# Patient Record
Sex: MALE | Race: OTHER | HISPANIC OR LATINO | URBAN - METROPOLITAN AREA
[De-identification: names, ages, dates, MRNs, and addresses within clinical notes are randomized per-mention and may not be internally consistent; named-entity substitution may affect disease eponyms.]

---

## 2020-01-01 ENCOUNTER — INPATIENT (INPATIENT)
Facility: HOSPITAL | Age: 65
LOS: 5 days | DRG: 870 | End: 2020-04-14
Attending: SURGERY | Admitting: SURGERY
Payer: MEDICARE

## 2020-01-01 VITALS
HEART RATE: 95 BPM | DIASTOLIC BLOOD PRESSURE: 39 MMHG | SYSTOLIC BLOOD PRESSURE: 66 MMHG | OXYGEN SATURATION: 70 % | TEMPERATURE: 101 F | RESPIRATION RATE: 25 BRPM

## 2020-01-01 VITALS
DIASTOLIC BLOOD PRESSURE: 91 MMHG | OXYGEN SATURATION: 83 % | HEART RATE: 124 BPM | SYSTOLIC BLOOD PRESSURE: 143 MMHG | RESPIRATION RATE: 36 BRPM

## 2020-01-01 DIAGNOSIS — Z51.5 ENCOUNTER FOR PALLIATIVE CARE: ICD-10-CM

## 2020-01-01 DIAGNOSIS — R53.81 OTHER MALAISE: ICD-10-CM

## 2020-01-01 DIAGNOSIS — J96.00 ACUTE RESPIRATORY FAILURE, UNSPECIFIED WHETHER WITH HYPOXIA OR HYPERCAPNIA: ICD-10-CM

## 2020-01-01 DIAGNOSIS — J18.9 PNEUMONIA, UNSPECIFIED ORGANISM: ICD-10-CM

## 2020-01-01 DIAGNOSIS — E43 UNSPECIFIED SEVERE PROTEIN-CALORIE MALNUTRITION: ICD-10-CM

## 2020-01-01 DIAGNOSIS — N17.9 ACUTE KIDNEY FAILURE, UNSPECIFIED: ICD-10-CM

## 2020-01-01 LAB
ALBUMIN SERPL ELPH-MCNC: 1.7 G/DL — LOW (ref 3.5–5)
ALBUMIN SERPL ELPH-MCNC: 2 G/DL — LOW (ref 3.5–5)
ALBUMIN SERPL ELPH-MCNC: 2.2 G/DL — LOW (ref 3.5–5)
ALBUMIN SERPL ELPH-MCNC: 2.3 G/DL — LOW (ref 3.5–5)
ALBUMIN SERPL ELPH-MCNC: 2.5 G/DL — LOW (ref 3.5–5)
ALBUMIN SERPL ELPH-MCNC: 2.8 G/DL — LOW (ref 3.5–5)
ALP SERPL-CCNC: 112 U/L — SIGNIFICANT CHANGE UP (ref 40–120)
ALP SERPL-CCNC: 173 U/L — HIGH (ref 40–120)
ALP SERPL-CCNC: 51 U/L — SIGNIFICANT CHANGE UP (ref 40–120)
ALP SERPL-CCNC: 53 U/L — SIGNIFICANT CHANGE UP (ref 40–120)
ALP SERPL-CCNC: 62 U/L — SIGNIFICANT CHANGE UP (ref 40–120)
ALP SERPL-CCNC: 88 U/L — SIGNIFICANT CHANGE UP (ref 40–120)
ALT FLD-CCNC: 116 U/L DA — HIGH (ref 10–60)
ALT FLD-CCNC: 150 U/L DA — HIGH (ref 10–60)
ALT FLD-CCNC: 275 U/L DA — HIGH (ref 10–60)
ALT FLD-CCNC: 329 U/L DA — HIGH (ref 10–60)
ALT FLD-CCNC: 33 U/L DA — SIGNIFICANT CHANGE UP (ref 10–60)
ALT FLD-CCNC: 45 U/L DA — SIGNIFICANT CHANGE UP (ref 10–60)
ANION GAP SERPL CALC-SCNC: 11 MMOL/L — SIGNIFICANT CHANGE UP (ref 5–17)
ANION GAP SERPL CALC-SCNC: 14 MMOL/L — SIGNIFICANT CHANGE UP (ref 5–17)
ANION GAP SERPL CALC-SCNC: 19 MMOL/L — HIGH (ref 5–17)
ANION GAP SERPL CALC-SCNC: 2 MMOL/L — LOW (ref 5–17)
ANION GAP SERPL CALC-SCNC: 4 MMOL/L — LOW (ref 5–17)
ANION GAP SERPL CALC-SCNC: 5 MMOL/L — SIGNIFICANT CHANGE UP (ref 5–17)
ANION GAP SERPL CALC-SCNC: 6 MMOL/L — SIGNIFICANT CHANGE UP (ref 5–17)
ANION GAP SERPL CALC-SCNC: 8 MMOL/L — SIGNIFICANT CHANGE UP (ref 5–17)
ANISOCYTOSIS BLD QL: SLIGHT — SIGNIFICANT CHANGE UP
APTT BLD: 35.2 SEC — SIGNIFICANT CHANGE UP (ref 27.5–36.3)
APTT BLD: 39.2 SEC — HIGH (ref 27.5–36.3)
AST SERPL-CCNC: 125 U/L — HIGH (ref 10–40)
AST SERPL-CCNC: 151 U/L — HIGH (ref 10–40)
AST SERPL-CCNC: 170 U/L — HIGH (ref 10–40)
AST SERPL-CCNC: 170 U/L — HIGH (ref 10–40)
AST SERPL-CCNC: 636 U/L — HIGH (ref 10–40)
AST SERPL-CCNC: 796 U/L — HIGH (ref 10–40)
BASE EXCESS BLDA CALC-SCNC: -2.6 MMOL/L — LOW (ref -2–2)
BASE EXCESS BLDA CALC-SCNC: -2.9 MMOL/L — SIGNIFICANT CHANGE UP (ref -2–2)
BASE EXCESS BLDA CALC-SCNC: -9.8 MMOL/L — LOW (ref -2–2)
BASE EXCESS BLDA CALC-SCNC: 1.9 MMOL/L — SIGNIFICANT CHANGE UP (ref -2–2)
BASE EXCESS BLDA CALC-SCNC: 28.5 MMOL/L — HIGH (ref -2–2)
BASE EXCESS BLDA CALC-SCNC: 4.8 MMOL/L — SIGNIFICANT CHANGE UP (ref -2–2)
BASE EXCESS BLDA CALC-SCNC: SIGNIFICANT CHANGE UP MMOL/L (ref -2–2)
BASOPHILS # BLD AUTO: 0 K/UL — SIGNIFICANT CHANGE UP (ref 0–0.2)
BASOPHILS # BLD AUTO: 0.01 K/UL — SIGNIFICANT CHANGE UP (ref 0–0.2)
BASOPHILS # BLD AUTO: 0.05 K/UL — SIGNIFICANT CHANGE UP (ref 0–0.2)
BASOPHILS # BLD AUTO: SIGNIFICANT CHANGE UP K/UL (ref 0–0.2)
BASOPHILS NFR BLD AUTO: 0 % — SIGNIFICANT CHANGE UP (ref 0–2)
BASOPHILS NFR BLD AUTO: 0.2 % — SIGNIFICANT CHANGE UP (ref 0–2)
BASOPHILS NFR BLD AUTO: 0.6 % — SIGNIFICANT CHANGE UP (ref 0–2)
BASOPHILS NFR BLD AUTO: SIGNIFICANT CHANGE UP % (ref 0–2)
BILIRUB SERPL-MCNC: 1.4 MG/DL — HIGH (ref 0.2–1.2)
BILIRUB SERPL-MCNC: 1.5 MG/DL — HIGH (ref 0.2–1.2)
BILIRUB SERPL-MCNC: 1.8 MG/DL — HIGH (ref 0.2–1.2)
BILIRUB SERPL-MCNC: 2 MG/DL — HIGH (ref 0.2–1.2)
BILIRUB SERPL-MCNC: 2.2 MG/DL — HIGH (ref 0.2–1.2)
BILIRUB SERPL-MCNC: 2.5 MG/DL — HIGH (ref 0.2–1.2)
BLOOD GAS COMMENTS ARTERIAL: SIGNIFICANT CHANGE UP
BUN SERPL-MCNC: 102 MG/DL — HIGH (ref 7–18)
BUN SERPL-MCNC: 109 MG/DL — HIGH (ref 7–18)
BUN SERPL-MCNC: 111 MG/DL — HIGH (ref 7–18)
BUN SERPL-MCNC: 113 MG/DL — HIGH (ref 7–18)
BUN SERPL-MCNC: 115 MG/DL — HIGH (ref 7–18)
BUN SERPL-MCNC: 117 MG/DL — HIGH (ref 7–18)
BUN SERPL-MCNC: 43 MG/DL — HIGH (ref 7–18)
BUN SERPL-MCNC: 50 MG/DL — HIGH (ref 7–18)
BUN SERPL-MCNC: 63 MG/DL — HIGH (ref 7–18)
BUN SERPL-MCNC: 76 MG/DL — HIGH (ref 7–18)
CALCIUM SERPL-MCNC: 8.1 MG/DL — LOW (ref 8.4–10.5)
CALCIUM SERPL-MCNC: 8.3 MG/DL — LOW (ref 8.4–10.5)
CALCIUM SERPL-MCNC: 8.5 MG/DL — SIGNIFICANT CHANGE UP (ref 8.4–10.5)
CALCIUM SERPL-MCNC: 8.6 MG/DL — SIGNIFICANT CHANGE UP (ref 8.4–10.5)
CALCIUM SERPL-MCNC: 8.8 MG/DL — SIGNIFICANT CHANGE UP (ref 8.4–10.5)
CALCIUM SERPL-MCNC: 8.9 MG/DL — SIGNIFICANT CHANGE UP (ref 8.4–10.5)
CALCIUM SERPL-MCNC: 9 MG/DL — SIGNIFICANT CHANGE UP (ref 8.4–10.5)
CALCIUM SERPL-MCNC: 9.2 MG/DL — SIGNIFICANT CHANGE UP (ref 8.4–10.5)
CD3-/CD16+CD56+(%): 5 % — SIGNIFICANT CHANGE UP (ref 4–25)
CD3-CD16+CD56+(ABS): 27 CELLS/UL — LOW (ref 70–760)
CHLORIDE SERPL-SCNC: 102 MMOL/L — SIGNIFICANT CHANGE UP (ref 96–108)
CHLORIDE SERPL-SCNC: 103 MMOL/L — SIGNIFICANT CHANGE UP (ref 96–108)
CHLORIDE SERPL-SCNC: 104 MMOL/L — SIGNIFICANT CHANGE UP (ref 96–108)
CHLORIDE SERPL-SCNC: 105 MMOL/L — SIGNIFICANT CHANGE UP (ref 96–108)
CHLORIDE SERPL-SCNC: 105 MMOL/L — SIGNIFICANT CHANGE UP (ref 96–108)
CHLORIDE SERPL-SCNC: 109 MMOL/L — HIGH (ref 96–108)
CHLORIDE SERPL-SCNC: 112 MMOL/L — HIGH (ref 96–108)
CHLORIDE SERPL-SCNC: 115 MMOL/L — HIGH (ref 96–108)
CHLORIDE SERPL-SCNC: 117 MMOL/L — HIGH (ref 96–108)
CHLORIDE SERPL-SCNC: 117 MMOL/L — HIGH (ref 96–108)
CK SERPL-CCNC: 168 U/L — SIGNIFICANT CHANGE UP (ref 35–232)
CO2 SERPL-SCNC: 15 MMOL/L — LOW (ref 22–31)
CO2 SERPL-SCNC: 20 MMOL/L — LOW (ref 22–31)
CO2 SERPL-SCNC: 23 MMOL/L — SIGNIFICANT CHANGE UP (ref 22–31)
CO2 SERPL-SCNC: 23 MMOL/L — SIGNIFICANT CHANGE UP (ref 22–31)
CO2 SERPL-SCNC: 25 MMOL/L — SIGNIFICANT CHANGE UP (ref 22–31)
CO2 SERPL-SCNC: 27 MMOL/L — SIGNIFICANT CHANGE UP (ref 22–31)
CO2 SERPL-SCNC: 28 MMOL/L — SIGNIFICANT CHANGE UP (ref 22–31)
CO2 SERPL-SCNC: 29 MMOL/L — SIGNIFICANT CHANGE UP (ref 22–31)
CO2 SERPL-SCNC: 30 MMOL/L — SIGNIFICANT CHANGE UP (ref 22–31)
CO2 SERPL-SCNC: 32 MMOL/L — HIGH (ref 22–31)
CREAT SERPL-MCNC: 1.72 MG/DL — HIGH (ref 0.5–1.3)
CREAT SERPL-MCNC: 1.76 MG/DL — HIGH (ref 0.5–1.3)
CREAT SERPL-MCNC: 1.96 MG/DL — HIGH (ref 0.5–1.3)
CREAT SERPL-MCNC: 2 MG/DL — HIGH (ref 0.5–1.3)
CREAT SERPL-MCNC: 2.45 MG/DL — HIGH (ref 0.5–1.3)
CREAT SERPL-MCNC: 2.59 MG/DL — HIGH (ref 0.5–1.3)
CREAT SERPL-MCNC: 2.84 MG/DL — HIGH (ref 0.5–1.3)
CREAT SERPL-MCNC: 3.01 MG/DL — HIGH (ref 0.5–1.3)
CREAT SERPL-MCNC: 3.07 MG/DL — HIGH (ref 0.5–1.3)
CREAT SERPL-MCNC: 3.08 MG/DL — HIGH (ref 0.5–1.3)
CRP SERPL-MCNC: 20.07 MG/DL — HIGH (ref 0–0.4)
CRP SERPL-MCNC: 30.05 MG/DL — HIGH (ref 0–0.4)
CRP SERPL-MCNC: 34.9 MG/DL — HIGH (ref 0–0.4)
CRP SERPL-MCNC: 8.89 MG/DL — HIGH (ref 0–0.4)
D DIMER BLD IA.RAPID-MCNC: 1280 NG/ML DDU — HIGH
D DIMER BLD IA.RAPID-MCNC: 2722 NG/ML DDU — HIGH
EOSINOPHIL # BLD AUTO: 0 K/UL — SIGNIFICANT CHANGE UP (ref 0–0.5)
EOSINOPHIL # BLD AUTO: 0.08 K/UL — SIGNIFICANT CHANGE UP (ref 0–0.5)
EOSINOPHIL # BLD AUTO: SIGNIFICANT CHANGE UP K/UL (ref 0–0.5)
EOSINOPHIL NFR BLD AUTO: 0 % — SIGNIFICANT CHANGE UP (ref 0–6)
EOSINOPHIL NFR BLD AUTO: 0.9 % — SIGNIFICANT CHANGE UP (ref 0–6)
EOSINOPHIL NFR BLD AUTO: SIGNIFICANT CHANGE UP % (ref 0–6)
ERYTHROCYTE [SEDIMENTATION RATE] IN BLOOD: 42 MM/HR — HIGH (ref 0–20)
FERRITIN SERPL-MCNC: 3030 NG/ML — HIGH (ref 30–400)
FERRITIN SERPL-MCNC: 6072 NG/ML — HIGH (ref 30–400)
FERRITIN SERPL-MCNC: 6132 NG/ML — HIGH (ref 30–400)
FERRITIN SERPL-MCNC: 9172 NG/ML — HIGH (ref 30–400)
FIBRINOGEN PPP-MCNC: 590 MG/DL — HIGH (ref 350–510)
G6PD RBC-CCNC: 18.7 U/G HGB — SIGNIFICANT CHANGE UP (ref 7–20.5)
GLUCOSE SERPL-MCNC: 123 MG/DL — HIGH (ref 70–99)
GLUCOSE SERPL-MCNC: 124 MG/DL — HIGH (ref 70–99)
GLUCOSE SERPL-MCNC: 124 MG/DL — HIGH (ref 70–99)
GLUCOSE SERPL-MCNC: 131 MG/DL — HIGH (ref 70–99)
GLUCOSE SERPL-MCNC: 180 MG/DL — HIGH (ref 70–99)
GLUCOSE SERPL-MCNC: 211 MG/DL — HIGH (ref 70–99)
GLUCOSE SERPL-MCNC: 278 MG/DL — HIGH (ref 70–99)
GLUCOSE SERPL-MCNC: 309 MG/DL — HIGH (ref 70–99)
GLUCOSE SERPL-MCNC: 373 MG/DL — HIGH (ref 70–99)
GLUCOSE SERPL-MCNC: 92 MG/DL — SIGNIFICANT CHANGE UP (ref 70–99)
HBA1C BLD-MCNC: 4.9 % — SIGNIFICANT CHANGE UP (ref 4–5.6)
HCO3 BLDA-SCNC: 15 MMOL/L — LOW (ref 23–27)
HCO3 BLDA-SCNC: 22 MMOL/L — LOW (ref 23–27)
HCO3 BLDA-SCNC: 26 MMOL/L — SIGNIFICANT CHANGE UP (ref 23–27)
HCO3 BLDA-SCNC: 26 MMOL/L — SIGNIFICANT CHANGE UP (ref 23–27)
HCO3 BLDA-SCNC: 27 MMOL/L — SIGNIFICANT CHANGE UP (ref 23–27)
HCO3 BLDA-SCNC: 28 MMOL/L — HIGH (ref 23–27)
HCO3 BLDA-SCNC: 62 MMOL/L — HIGH (ref 23–27)
HCT VFR BLD CALC: 29.4 % — LOW (ref 39–50)
HCT VFR BLD CALC: 31.7 % — LOW (ref 39–50)
HCT VFR BLD CALC: 33.7 % — LOW (ref 39–50)
HCT VFR BLD CALC: 34.5 % — LOW (ref 39–50)
HCT VFR BLD CALC: 37.6 % — LOW (ref 39–50)
HCT VFR BLD CALC: 38.9 % — LOW (ref 39–50)
HCT VFR BLD CALC: 39.5 % — SIGNIFICANT CHANGE UP (ref 39–50)
HGB BLD-MCNC: 10.4 G/DL — LOW (ref 13–17)
HGB BLD-MCNC: 10.6 G/DL — LOW (ref 13–17)
HGB BLD-MCNC: 11.1 G/DL — LOW (ref 13–17)
HGB BLD-MCNC: 11.8 G/DL — LOW (ref 13–17)
HGB BLD-MCNC: 11.9 G/DL — LOW (ref 13–17)
HGB BLD-MCNC: 12.5 G/DL — LOW (ref 13–17)
HGB BLD-MCNC: 9.3 G/DL — LOW (ref 13–17)
HOROWITZ INDEX BLDA+IHG-RTO: 100 — SIGNIFICANT CHANGE UP
HOROWITZ INDEX BLDA+IHG-RTO: 100 — SIGNIFICANT CHANGE UP
HOROWITZ INDEX BLDA+IHG-RTO: 50 — SIGNIFICANT CHANGE UP
HOROWITZ INDEX BLDA+IHG-RTO: 60 — SIGNIFICANT CHANGE UP
HOROWITZ INDEX BLDA+IHG-RTO: 70 — SIGNIFICANT CHANGE UP
HYPOCHROMIA BLD QL: SLIGHT — SIGNIFICANT CHANGE UP
HYPOSEGMENTATION: PRESENT — SIGNIFICANT CHANGE UP
IMM GRANULOCYTES NFR BLD AUTO: 0.7 % — SIGNIFICANT CHANGE UP (ref 0–1.5)
IMM GRANULOCYTES NFR BLD AUTO: 1.2 % — SIGNIFICANT CHANGE UP (ref 0–1.5)
IMM GRANULOCYTES NFR BLD AUTO: SIGNIFICANT CHANGE UP % (ref 0–1.5)
INR BLD: 1.4 RATIO — HIGH (ref 0.88–1.16)
INR BLD: 1.41 RATIO — HIGH (ref 0.88–1.16)
LACTATE SERPL-SCNC: 12.2 MMOL/L — CRITICAL HIGH (ref 0.7–2)
LACTATE SERPL-SCNC: 2.9 MMOL/L — HIGH (ref 0.7–2)
LACTATE SERPL-SCNC: 4 MMOL/L — CRITICAL HIGH (ref 0.7–2)
LACTATE SERPL-SCNC: 4.6 MMOL/L — CRITICAL HIGH (ref 0.7–2)
LDH SERPL L TO P-CCNC: 910 U/L — HIGH (ref 120–225)
LDH SERPL L TO P-CCNC: 975 U/L — HIGH (ref 120–225)
LYMPHOCYTES # BLD AUTO: 0.46 K/UL — LOW (ref 1–3.3)
LYMPHOCYTES # BLD AUTO: 0.56 K/UL — LOW (ref 1–3.3)
LYMPHOCYTES # BLD AUTO: 0.85 K/UL — LOW (ref 1–3.3)
LYMPHOCYTES # BLD AUTO: 1.09 K/UL — SIGNIFICANT CHANGE UP (ref 1–3.3)
LYMPHOCYTES # BLD AUTO: 1.27 K/UL — SIGNIFICANT CHANGE UP (ref 1–3.3)
LYMPHOCYTES # BLD AUTO: 11 % — LOW (ref 13–44)
LYMPHOCYTES # BLD AUTO: 11.3 % — LOW (ref 13–44)
LYMPHOCYTES # BLD AUTO: 12 % — LOW (ref 13–44)
LYMPHOCYTES # BLD AUTO: 14.8 % — SIGNIFICANT CHANGE UP (ref 13–44)
LYMPHOCYTES # BLD AUTO: 16 % — SIGNIFICANT CHANGE UP (ref 13–44)
LYMPHOCYTES # BLD AUTO: SIGNIFICANT CHANGE UP % (ref 13–44)
LYMPHOCYTES # BLD AUTO: SIGNIFICANT CHANGE UP K/UL (ref 1–3.3)
LYMPHOCYTES, ABSOLUTE: 514 CELLS/UL — LOW (ref 850–3900)
MACROCYTES BLD QL: SLIGHT — SIGNIFICANT CHANGE UP
MAGNESIUM SERPL-MCNC: 2.1 MG/DL — SIGNIFICANT CHANGE UP (ref 1.6–2.6)
MAGNESIUM SERPL-MCNC: 2.1 MG/DL — SIGNIFICANT CHANGE UP (ref 1.6–2.6)
MAGNESIUM SERPL-MCNC: 2.2 MG/DL — SIGNIFICANT CHANGE UP (ref 1.6–2.6)
MAGNESIUM SERPL-MCNC: 2.6 MG/DL — SIGNIFICANT CHANGE UP (ref 1.6–2.6)
MAGNESIUM SERPL-MCNC: 2.7 MG/DL — HIGH (ref 1.6–2.6)
MAGNESIUM SERPL-MCNC: 2.9 MG/DL — HIGH (ref 1.6–2.6)
MAGNESIUM SERPL-MCNC: 3 MG/DL — HIGH (ref 1.6–2.6)
MANUAL SMEAR VERIFICATION: SIGNIFICANT CHANGE UP
MCHC RBC-ENTMCNC: 29.9 GM/DL — LOW (ref 32–36)
MCHC RBC-ENTMCNC: 30.3 PG — SIGNIFICANT CHANGE UP (ref 27–34)
MCHC RBC-ENTMCNC: 30.3 PG — SIGNIFICANT CHANGE UP (ref 27–34)
MCHC RBC-ENTMCNC: 30.5 PG — SIGNIFICANT CHANGE UP (ref 27–34)
MCHC RBC-ENTMCNC: 30.5 PG — SIGNIFICANT CHANGE UP (ref 27–34)
MCHC RBC-ENTMCNC: 31 PG — SIGNIFICANT CHANGE UP (ref 27–34)
MCHC RBC-ENTMCNC: 31.2 PG — SIGNIFICANT CHANGE UP (ref 27–34)
MCHC RBC-ENTMCNC: 31.5 GM/DL — LOW (ref 32–36)
MCHC RBC-ENTMCNC: 31.6 GM/DL — LOW (ref 32–36)
MCHC RBC-ENTMCNC: 31.6 GM/DL — LOW (ref 32–36)
MCHC RBC-ENTMCNC: 31.8 PG — SIGNIFICANT CHANGE UP (ref 27–34)
MCHC RBC-ENTMCNC: 32.1 GM/DL — SIGNIFICANT CHANGE UP (ref 32–36)
MCHC RBC-ENTMCNC: 32.2 GM/DL — SIGNIFICANT CHANGE UP (ref 32–36)
MCHC RBC-ENTMCNC: 32.8 GM/DL — SIGNIFICANT CHANGE UP (ref 32–36)
MCV RBC AUTO: 101.5 FL — HIGH (ref 80–100)
MCV RBC AUTO: 94.3 FL — SIGNIFICANT CHANGE UP (ref 80–100)
MCV RBC AUTO: 94.6 FL — SIGNIFICANT CHANGE UP (ref 80–100)
MCV RBC AUTO: 96.4 FL — SIGNIFICANT CHANGE UP (ref 80–100)
MCV RBC AUTO: 96.8 FL — SIGNIFICANT CHANGE UP (ref 80–100)
MCV RBC AUTO: 98.4 FL — SIGNIFICANT CHANGE UP (ref 80–100)
MCV RBC AUTO: 99 FL — SIGNIFICANT CHANGE UP (ref 80–100)
MONOCYTES # BLD AUTO: 0 K/UL — SIGNIFICANT CHANGE UP (ref 0–0.9)
MONOCYTES # BLD AUTO: 0.1 K/UL — SIGNIFICANT CHANGE UP (ref 0–0.9)
MONOCYTES # BLD AUTO: 0.22 K/UL — SIGNIFICANT CHANGE UP (ref 0–0.9)
MONOCYTES # BLD AUTO: 0.23 K/UL — SIGNIFICANT CHANGE UP (ref 0–0.9)
MONOCYTES # BLD AUTO: 0.31 K/UL — SIGNIFICANT CHANGE UP (ref 0–0.9)
MONOCYTES # BLD AUTO: SIGNIFICANT CHANGE UP K/UL (ref 0–0.9)
MONOCYTES NFR BLD AUTO: 0 % — LOW (ref 2–14)
MONOCYTES NFR BLD AUTO: 1 % — LOW (ref 2–14)
MONOCYTES NFR BLD AUTO: 2.6 % — SIGNIFICANT CHANGE UP (ref 2–14)
MONOCYTES NFR BLD AUTO: 6.3 % — SIGNIFICANT CHANGE UP (ref 2–14)
MONOCYTES NFR BLD AUTO: 8 % — SIGNIFICANT CHANGE UP (ref 2–14)
MONOCYTES NFR BLD AUTO: SIGNIFICANT CHANGE UP % (ref 2–14)
MYELOCYTES NFR BLD: 7 % — HIGH (ref 0–0)
NEUTROPHILS # BLD AUTO: 2.2 K/UL — SIGNIFICANT CHANGE UP (ref 1.8–7.4)
NEUTROPHILS # BLD AUTO: 4 K/UL — SIGNIFICANT CHANGE UP (ref 1.8–7.4)
NEUTROPHILS # BLD AUTO: 5.69 K/UL — SIGNIFICANT CHANGE UP (ref 1.8–7.4)
NEUTROPHILS # BLD AUTO: 6.9 K/UL — SIGNIFICANT CHANGE UP (ref 1.8–7.4)
NEUTROPHILS # BLD AUTO: 8.73 K/UL — HIGH (ref 1.8–7.4)
NEUTROPHILS # BLD AUTO: SIGNIFICANT CHANGE UP K/UL (ref 1.8–7.4)
NEUTROPHILS NFR BLD AUTO: 17 % — LOW (ref 43–77)
NEUTROPHILS NFR BLD AUTO: 75 % — SIGNIFICANT CHANGE UP (ref 43–77)
NEUTROPHILS NFR BLD AUTO: 80.4 % — HIGH (ref 43–77)
NEUTROPHILS NFR BLD AUTO: 81 % — HIGH (ref 43–77)
NEUTROPHILS NFR BLD AUTO: 83 % — HIGH (ref 43–77)
NEUTROPHILS NFR BLD AUTO: SIGNIFICANT CHANGE UP % (ref 43–77)
NEUTS BAND # BLD: 63 % — HIGH (ref 0–8)
NRBC # BLD: 0 /100 WBCS — SIGNIFICANT CHANGE UP (ref 0–0)
NRBC # BLD: 0 /100 WBCS — SIGNIFICANT CHANGE UP (ref 0–0)
NRBC # BLD: 2 /100 WBCS — HIGH (ref 0–0)
NRBC # BLD: 22 /100 — HIGH (ref 0–0)
NRBC # BLD: 3 /100 WBCS — HIGH (ref 0–0)
NT-PROBNP SERPL-SCNC: HIGH PG/ML (ref 0–125)
PCO2 BLDA: 122 MMHG — CRITICAL HIGH (ref 32–46)
PCO2 BLDA: 32 MMHG — SIGNIFICANT CHANGE UP (ref 32–46)
PCO2 BLDA: 38 MMHG — SIGNIFICANT CHANGE UP (ref 32–46)
PCO2 BLDA: 39 MMHG — SIGNIFICANT CHANGE UP (ref 32–46)
PCO2 BLDA: 40 MMHG — SIGNIFICANT CHANGE UP (ref 32–46)
PCO2 BLDA: 41 MMHG — SIGNIFICANT CHANGE UP (ref 32–46)
PCO2 BLDA: 77 MMHG — CRITICAL HIGH (ref 32–46)
PH BLDA: 7.17 — CRITICAL LOW (ref 7.35–7.45)
PH BLDA: 7.3 — LOW (ref 7.35–7.45)
PH BLDA: 7.33 — LOW (ref 7.35–7.45)
PH BLDA: 7.37 — SIGNIFICANT CHANGE UP (ref 7.35–7.45)
PH BLDA: 7.41 — SIGNIFICANT CHANGE UP (ref 7.35–7.45)
PH BLDA: 7.42 — SIGNIFICANT CHANGE UP (ref 7.35–7.45)
PH BLDA: 7.47 — HIGH (ref 7.35–7.45)
PHOSPHATE SERPL-MCNC: 1.7 MG/DL — LOW (ref 2.5–4.5)
PHOSPHATE SERPL-MCNC: 2.7 MG/DL — SIGNIFICANT CHANGE UP (ref 2.5–4.5)
PHOSPHATE SERPL-MCNC: 4.4 MG/DL — SIGNIFICANT CHANGE UP (ref 2.5–4.5)
PHOSPHATE SERPL-MCNC: 4.6 MG/DL — HIGH (ref 2.5–4.5)
PHOSPHATE SERPL-MCNC: 6.3 MG/DL — HIGH (ref 2.5–4.5)
PHOSPHATE SERPL-MCNC: 6.6 MG/DL — HIGH (ref 2.5–4.5)
PLAT MORPH BLD: NORMAL — SIGNIFICANT CHANGE UP
PLATELET # BLD AUTO: 110 K/UL — LOW (ref 150–400)
PLATELET # BLD AUTO: 132 K/UL — LOW (ref 150–400)
PLATELET # BLD AUTO: 135 K/UL — LOW (ref 150–400)
PLATELET # BLD AUTO: 164 K/UL — SIGNIFICANT CHANGE UP (ref 150–400)
PLATELET # BLD AUTO: 179 K/UL — SIGNIFICANT CHANGE UP (ref 150–400)
PLATELET # BLD AUTO: 183 K/UL — SIGNIFICANT CHANGE UP (ref 150–400)
PLATELET # BLD AUTO: 206 K/UL — SIGNIFICANT CHANGE UP (ref 150–400)
PLATELET COUNT - ESTIMATE: NORMAL — SIGNIFICANT CHANGE UP
PO2 BLDA: 102 MMHG — SIGNIFICANT CHANGE UP (ref 74–108)
PO2 BLDA: 109 MMHG — HIGH (ref 74–108)
PO2 BLDA: 152 MMHG — HIGH (ref 74–108)
PO2 BLDA: 183 MMHG — HIGH (ref 74–108)
PO2 BLDA: 42 MMHG — CRITICAL LOW (ref 74–108)
PO2 BLDA: 70 MMHG — LOW (ref 74–108)
PO2 BLDA: 73 MMHG — LOW (ref 74–108)
POIKILOCYTOSIS BLD QL AUTO: SLIGHT — SIGNIFICANT CHANGE UP
POLYCHROMASIA BLD QL SMEAR: SLIGHT — SIGNIFICANT CHANGE UP
POTASSIUM SERPL-MCNC: 4 MMOL/L — SIGNIFICANT CHANGE UP (ref 3.5–5.3)
POTASSIUM SERPL-MCNC: 4.1 MMOL/L — SIGNIFICANT CHANGE UP (ref 3.5–5.3)
POTASSIUM SERPL-MCNC: 4.1 MMOL/L — SIGNIFICANT CHANGE UP (ref 3.5–5.3)
POTASSIUM SERPL-MCNC: 4.2 MMOL/L — SIGNIFICANT CHANGE UP (ref 3.5–5.3)
POTASSIUM SERPL-MCNC: 4.6 MMOL/L — SIGNIFICANT CHANGE UP (ref 3.5–5.3)
POTASSIUM SERPL-MCNC: 4.7 MMOL/L — SIGNIFICANT CHANGE UP (ref 3.5–5.3)
POTASSIUM SERPL-MCNC: 4.8 MMOL/L — SIGNIFICANT CHANGE UP (ref 3.5–5.3)
POTASSIUM SERPL-MCNC: 5.3 MMOL/L — SIGNIFICANT CHANGE UP (ref 3.5–5.3)
POTASSIUM SERPL-SCNC: 4 MMOL/L — SIGNIFICANT CHANGE UP (ref 3.5–5.3)
POTASSIUM SERPL-SCNC: 4.1 MMOL/L — SIGNIFICANT CHANGE UP (ref 3.5–5.3)
POTASSIUM SERPL-SCNC: 4.1 MMOL/L — SIGNIFICANT CHANGE UP (ref 3.5–5.3)
POTASSIUM SERPL-SCNC: 4.2 MMOL/L — SIGNIFICANT CHANGE UP (ref 3.5–5.3)
POTASSIUM SERPL-SCNC: 4.6 MMOL/L — SIGNIFICANT CHANGE UP (ref 3.5–5.3)
POTASSIUM SERPL-SCNC: 4.7 MMOL/L — SIGNIFICANT CHANGE UP (ref 3.5–5.3)
POTASSIUM SERPL-SCNC: 4.8 MMOL/L — SIGNIFICANT CHANGE UP (ref 3.5–5.3)
POTASSIUM SERPL-SCNC: 5.3 MMOL/L — SIGNIFICANT CHANGE UP (ref 3.5–5.3)
PROCALCITONIN SERPL-MCNC: 52.2 NG/ML — HIGH (ref 0.02–0.1)
PROT SERPL-MCNC: 6 G/DL — SIGNIFICANT CHANGE UP (ref 6–8.3)
PROT SERPL-MCNC: 6.5 G/DL — SIGNIFICANT CHANGE UP (ref 6–8.3)
PROT SERPL-MCNC: 7 G/DL — SIGNIFICANT CHANGE UP (ref 6–8.3)
PROT SERPL-MCNC: 7.1 G/DL — SIGNIFICANT CHANGE UP (ref 6–8.3)
PROT SERPL-MCNC: 7.2 G/DL — SIGNIFICANT CHANGE UP (ref 6–8.3)
PROT SERPL-MCNC: 8 G/DL — SIGNIFICANT CHANGE UP (ref 6–8.3)
PROTHROM AB SERPL-ACNC: 16 SEC — HIGH (ref 10–12.9)
PROTHROM AB SERPL-ACNC: 16.1 SEC — HIGH (ref 10–12.9)
RBC # BLD: 3.05 M/UL — LOW (ref 4.2–5.8)
RBC # BLD: 3.35 M/UL — LOW (ref 4.2–5.8)
RBC # BLD: 3.48 M/UL — LOW (ref 4.2–5.8)
RBC # BLD: 3.66 M/UL — LOW (ref 4.2–5.8)
RBC # BLD: 3.82 M/UL — LOW (ref 4.2–5.8)
RBC # BLD: 3.89 M/UL — LOW (ref 4.2–5.8)
RBC # BLD: 3.93 M/UL — LOW (ref 4.2–5.8)
RBC # FLD: 16.5 % — HIGH (ref 10.3–14.5)
RBC # FLD: 16.6 % — HIGH (ref 10.3–14.5)
RBC # FLD: 16.6 % — HIGH (ref 10.3–14.5)
RBC # FLD: 16.7 % — HIGH (ref 10.3–14.5)
RBC # FLD: 16.7 % — HIGH (ref 10.3–14.5)
RBC # FLD: 16.9 % — HIGH (ref 10.3–14.5)
RBC # FLD: 17.4 % — HIGH (ref 10.3–14.5)
RBC BLD AUTO: ABNORMAL
SAO2 % BLDA: 66 % — LOW (ref 92–96)
SAO2 % BLDA: 94 % — SIGNIFICANT CHANGE UP (ref 92–96)
SAO2 % BLDA: 94 % — SIGNIFICANT CHANGE UP (ref 92–96)
SAO2 % BLDA: 98 % — HIGH (ref 92–96)
SAO2 % BLDA: 99 % — HIGH (ref 92–96)
SAO2 % BLDA: 99 % — HIGH (ref 92–96)
SAO2 % BLDA: SIGNIFICANT CHANGE UP % (ref 92–96)
SARS-COV-2 RNA SPEC QL NAA+PROBE: DETECTED
SODIUM SERPL-SCNC: 136 MMOL/L — SIGNIFICANT CHANGE UP (ref 135–145)
SODIUM SERPL-SCNC: 137 MMOL/L — SIGNIFICANT CHANGE UP (ref 135–145)
SODIUM SERPL-SCNC: 139 MMOL/L — SIGNIFICANT CHANGE UP (ref 135–145)
SODIUM SERPL-SCNC: 139 MMOL/L — SIGNIFICANT CHANGE UP (ref 135–145)
SODIUM SERPL-SCNC: 140 MMOL/L — SIGNIFICANT CHANGE UP (ref 135–145)
SODIUM SERPL-SCNC: 142 MMOL/L — SIGNIFICANT CHANGE UP (ref 135–145)
SODIUM SERPL-SCNC: 148 MMOL/L — HIGH (ref 135–145)
SODIUM SERPL-SCNC: 149 MMOL/L — HIGH (ref 135–145)
SODIUM SERPL-SCNC: 151 MMOL/L — HIGH (ref 135–145)
SODIUM SERPL-SCNC: 151 MMOL/L — HIGH (ref 135–145)
TRIGL SERPL-MCNC: 315 MG/DL — HIGH (ref 10–149)
TROPONIN I SERPL-MCNC: 0.64 NG/ML — HIGH (ref 0–0.04)
TROPONIN I SERPL-MCNC: 1 NG/ML — HIGH (ref 0–0.04)
TROPONIN I SERPL-MCNC: 2.46 NG/ML — HIGH (ref 0–0.04)
VARIANT LYMPHS # BLD: 1 % — SIGNIFICANT CHANGE UP (ref 0–6)
WBC # BLD: 2.9 K/UL — LOW (ref 3.8–10.5)
WBC # BLD: 4.56 K/UL — SIGNIFICANT CHANGE UP (ref 3.8–10.5)
WBC # BLD: 4.63 K/UL — SIGNIFICANT CHANGE UP (ref 3.8–10.5)
WBC # BLD: 4.94 K/UL — SIGNIFICANT CHANGE UP (ref 3.8–10.5)
WBC # BLD: 7.11 K/UL — SIGNIFICANT CHANGE UP (ref 3.8–10.5)
WBC # BLD: 8.58 K/UL — SIGNIFICANT CHANGE UP (ref 3.8–10.5)
WBC # BLD: 9.92 K/UL — SIGNIFICANT CHANGE UP (ref 3.8–10.5)
WBC # FLD AUTO: 2.9 K/UL — LOW (ref 3.8–10.5)
WBC # FLD AUTO: 4.56 K/UL — SIGNIFICANT CHANGE UP (ref 3.8–10.5)
WBC # FLD AUTO: 4.63 K/UL — SIGNIFICANT CHANGE UP (ref 3.8–10.5)
WBC # FLD AUTO: 4.94 K/UL — SIGNIFICANT CHANGE UP (ref 3.8–10.5)
WBC # FLD AUTO: 7.11 K/UL — SIGNIFICANT CHANGE UP (ref 3.8–10.5)
WBC # FLD AUTO: 8.58 K/UL — SIGNIFICANT CHANGE UP (ref 3.8–10.5)
WBC # FLD AUTO: 9.92 K/UL — SIGNIFICANT CHANGE UP (ref 3.8–10.5)

## 2020-01-01 PROCEDURE — 85027 COMPLETE CBC AUTOMATED: CPT

## 2020-01-01 PROCEDURE — 99291 CRITICAL CARE FIRST HOUR: CPT | Mod: 25

## 2020-01-01 PROCEDURE — 82962 GLUCOSE BLOOD TEST: CPT

## 2020-01-01 PROCEDURE — 82728 ASSAY OF FERRITIN: CPT

## 2020-01-01 PROCEDURE — 31500 INSERT EMERGENCY AIRWAY: CPT

## 2020-01-01 PROCEDURE — 87635 SARS-COV-2 COVID-19 AMP PRB: CPT

## 2020-01-01 PROCEDURE — 83615 LACTATE (LD) (LDH) ENZYME: CPT

## 2020-01-01 PROCEDURE — 83520 IMMUNOASSAY QUANT NOS NONAB: CPT

## 2020-01-01 PROCEDURE — 83735 ASSAY OF MAGNESIUM: CPT

## 2020-01-01 PROCEDURE — 82550 ASSAY OF CK (CPK): CPT

## 2020-01-01 PROCEDURE — 99291 CRITICAL CARE FIRST HOUR: CPT | Mod: CS,25

## 2020-01-01 PROCEDURE — 93005 ELECTROCARDIOGRAM TRACING: CPT

## 2020-01-01 PROCEDURE — 71045 X-RAY EXAM CHEST 1 VIEW: CPT | Mod: 26

## 2020-01-01 PROCEDURE — 80053 COMPREHEN METABOLIC PANEL: CPT

## 2020-01-01 PROCEDURE — 84100 ASSAY OF PHOSPHORUS: CPT

## 2020-01-01 PROCEDURE — 99291 CRITICAL CARE FIRST HOUR: CPT | Mod: CS

## 2020-01-01 PROCEDURE — 99291 CRITICAL CARE FIRST HOUR: CPT

## 2020-01-01 PROCEDURE — 36415 COLL VENOUS BLD VENIPUNCTURE: CPT

## 2020-01-01 PROCEDURE — 31500 INSERT EMERGENCY AIRWAY: CPT | Mod: CS

## 2020-01-01 PROCEDURE — 84478 ASSAY OF TRIGLYCERIDES: CPT

## 2020-01-01 PROCEDURE — 84484 ASSAY OF TROPONIN QUANT: CPT

## 2020-01-01 PROCEDURE — 85610 PROTHROMBIN TIME: CPT

## 2020-01-01 PROCEDURE — 85379 FIBRIN DEGRADATION QUANT: CPT

## 2020-01-01 PROCEDURE — 80048 BASIC METABOLIC PNL TOTAL CA: CPT

## 2020-01-01 PROCEDURE — 85384 FIBRINOGEN ACTIVITY: CPT

## 2020-01-01 PROCEDURE — 86140 C-REACTIVE PROTEIN: CPT

## 2020-01-01 PROCEDURE — 83036 HEMOGLOBIN GLYCOSYLATED A1C: CPT

## 2020-01-01 PROCEDURE — 82803 BLOOD GASES ANY COMBINATION: CPT

## 2020-01-01 PROCEDURE — 99223 1ST HOSP IP/OBS HIGH 75: CPT

## 2020-01-01 PROCEDURE — 85652 RBC SED RATE AUTOMATED: CPT

## 2020-01-01 PROCEDURE — 94002 VENT MGMT INPAT INIT DAY: CPT

## 2020-01-01 PROCEDURE — 85730 THROMBOPLASTIN TIME PARTIAL: CPT

## 2020-01-01 PROCEDURE — 94760 N-INVAS EAR/PLS OXIMETRY 1: CPT

## 2020-01-01 PROCEDURE — 82955 ASSAY OF G6PD ENZYME: CPT

## 2020-01-01 PROCEDURE — 71045 X-RAY EXAM CHEST 1 VIEW: CPT | Mod: 26,CS

## 2020-01-01 PROCEDURE — 83605 ASSAY OF LACTIC ACID: CPT

## 2020-01-01 PROCEDURE — 94003 VENT MGMT INPAT SUBQ DAY: CPT

## 2020-01-01 PROCEDURE — 83880 ASSAY OF NATRIURETIC PEPTIDE: CPT

## 2020-01-01 PROCEDURE — 84145 PROCALCITONIN (PCT): CPT

## 2020-01-01 PROCEDURE — 71045 X-RAY EXAM CHEST 1 VIEW: CPT

## 2020-01-01 RX ORDER — HYDROXYCHLOROQUINE SULFATE 200 MG
400 TABLET ORAL EVERY 12 HOURS
Refills: 0 | Status: DISCONTINUED | OUTPATIENT
Start: 2020-01-01 | End: 2020-01-01

## 2020-01-01 RX ORDER — PHENYLEPHRINE HYDROCHLORIDE 10 MG/ML
0.4 INJECTION INTRAVENOUS
Qty: 160 | Refills: 0 | Status: DISCONTINUED | OUTPATIENT
Start: 2020-01-01 | End: 2020-01-01

## 2020-01-01 RX ORDER — INSULIN GLARGINE 100 [IU]/ML
10 INJECTION, SOLUTION SUBCUTANEOUS EVERY MORNING
Refills: 0 | Status: DISCONTINUED | OUTPATIENT
Start: 2020-01-01 | End: 2020-01-01

## 2020-01-01 RX ORDER — FENOFIBRATE,MICRONIZED 130 MG
1 CAPSULE ORAL
Qty: 0 | Refills: 0 | DISCHARGE

## 2020-01-01 RX ORDER — GLIPIZIDE/METFORMIN HCL 2.5-500 MG
1 TABLET ORAL
Qty: 0 | Refills: 0 | DISCHARGE

## 2020-01-01 RX ORDER — PROPOFOL 10 MG/ML
10 INJECTION, EMULSION INTRAVENOUS
Qty: 1000 | Refills: 0 | Status: DISCONTINUED | OUTPATIENT
Start: 2020-01-01 | End: 2020-01-01

## 2020-01-01 RX ORDER — ETOMIDATE 2 MG/ML
20 INJECTION INTRAVENOUS ONCE
Refills: 0 | Status: COMPLETED | OUTPATIENT
Start: 2020-01-01 | End: 2020-01-01

## 2020-01-01 RX ORDER — PROPOFOL 10 MG/ML
50 INJECTION, EMULSION INTRAVENOUS ONCE
Refills: 0 | Status: COMPLETED | OUTPATIENT
Start: 2020-01-01 | End: 2020-01-01

## 2020-01-01 RX ORDER — PROPOFOL 10 MG/ML
5 INJECTION, EMULSION INTRAVENOUS
Qty: 1000 | Refills: 0 | Status: DISCONTINUED | OUTPATIENT
Start: 2020-01-01 | End: 2020-01-01

## 2020-01-01 RX ORDER — DEXMEDETOMIDINE HYDROCHLORIDE IN 0.9% SODIUM CHLORIDE 4 UG/ML
0.2 INJECTION INTRAVENOUS
Qty: 200 | Refills: 0 | Status: DISCONTINUED | OUTPATIENT
Start: 2020-01-01 | End: 2020-01-01

## 2020-01-01 RX ORDER — TAMSULOSIN HYDROCHLORIDE 0.4 MG/1
1 CAPSULE ORAL
Qty: 0 | Refills: 0 | DISCHARGE

## 2020-01-01 RX ORDER — ANAKINRA 100MG/0.67
100 SYRINGE (ML) SUBCUTANEOUS EVERY 12 HOURS
Refills: 0 | Status: COMPLETED | OUTPATIENT
Start: 2020-01-01 | End: 2020-01-01

## 2020-01-01 RX ORDER — INSULIN LISPRO 100/ML
VIAL (ML) SUBCUTANEOUS EVERY 6 HOURS
Refills: 0 | Status: DISCONTINUED | OUTPATIENT
Start: 2020-01-01 | End: 2020-01-01

## 2020-01-01 RX ORDER — ACETAMINOPHEN 500 MG
650 TABLET ORAL ONCE
Refills: 0 | Status: DISCONTINUED | OUTPATIENT
Start: 2020-01-01 | End: 2020-01-01

## 2020-01-01 RX ORDER — SODIUM CHLORIDE 9 MG/ML
1000 INJECTION, SOLUTION INTRAVENOUS
Refills: 0 | Status: DISCONTINUED | OUTPATIENT
Start: 2020-01-01 | End: 2020-01-01

## 2020-01-01 RX ORDER — HYDROMORPHONE HYDROCHLORIDE 2 MG/ML
0.5 INJECTION INTRAMUSCULAR; INTRAVENOUS; SUBCUTANEOUS
Qty: 10 | Refills: 0 | Status: DISCONTINUED | OUTPATIENT
Start: 2020-01-01 | End: 2020-01-01

## 2020-01-01 RX ORDER — HYDROMORPHONE HYDROCHLORIDE 2 MG/ML
0.5 INJECTION INTRAMUSCULAR; INTRAVENOUS; SUBCUTANEOUS
Qty: 100 | Refills: 0 | Status: DISCONTINUED | OUTPATIENT
Start: 2020-01-01 | End: 2020-01-01

## 2020-01-01 RX ORDER — FUROSEMIDE 40 MG
40 TABLET ORAL ONCE
Refills: 0 | Status: COMPLETED | OUTPATIENT
Start: 2020-01-01 | End: 2020-01-01

## 2020-01-01 RX ORDER — INSULIN LISPRO 100/ML
5 VIAL (ML) SUBCUTANEOUS EVERY 6 HOURS
Refills: 0 | Status: DISCONTINUED | OUTPATIENT
Start: 2020-01-01 | End: 2020-01-01

## 2020-01-01 RX ORDER — ROSUVASTATIN CALCIUM 5 MG/1
1 TABLET ORAL
Qty: 0 | Refills: 0 | DISCHARGE

## 2020-01-01 RX ORDER — SUCCINYLCHOLINE CHLORIDE 100 MG/5ML
100 SYRINGE (ML) INTRAVENOUS ONCE
Refills: 0 | Status: COMPLETED | OUTPATIENT
Start: 2020-01-01 | End: 2020-01-01

## 2020-01-01 RX ORDER — ACETAMINOPHEN 500 MG
650 TABLET ORAL EVERY 6 HOURS
Refills: 0 | Status: DISCONTINUED | OUTPATIENT
Start: 2020-01-01 | End: 2020-01-01

## 2020-01-01 RX ORDER — CEFTRIAXONE 500 MG/1
INJECTION, POWDER, FOR SOLUTION INTRAMUSCULAR; INTRAVENOUS
Refills: 0 | Status: DISCONTINUED | OUTPATIENT
Start: 2020-01-01 | End: 2020-01-01

## 2020-01-01 RX ORDER — INSULIN GLARGINE 100 [IU]/ML
15 INJECTION, SOLUTION SUBCUTANEOUS EVERY MORNING
Refills: 0 | Status: DISCONTINUED | OUTPATIENT
Start: 2020-01-01 | End: 2020-01-01

## 2020-01-01 RX ORDER — ENOXAPARIN SODIUM 100 MG/ML
70 INJECTION SUBCUTANEOUS DAILY
Refills: 0 | Status: DISCONTINUED | OUTPATIENT
Start: 2020-01-01 | End: 2020-01-01

## 2020-01-01 RX ORDER — ACETAMINOPHEN 500 MG
650 TABLET ORAL ONCE
Refills: 0 | Status: COMPLETED | OUTPATIENT
Start: 2020-01-01 | End: 2020-01-01

## 2020-01-01 RX ORDER — APIXABAN 2.5 MG/1
1 TABLET, FILM COATED ORAL
Qty: 0 | Refills: 0 | DISCHARGE

## 2020-01-01 RX ORDER — FUROSEMIDE 40 MG
1 TABLET ORAL
Qty: 0 | Refills: 0 | DISCHARGE

## 2020-01-01 RX ORDER — AMLODIPINE BESYLATE 2.5 MG/1
10 TABLET ORAL DAILY
Refills: 0 | Status: DISCONTINUED | OUTPATIENT
Start: 2020-01-01 | End: 2020-01-01

## 2020-01-01 RX ORDER — SODIUM CHLORIDE 9 MG/ML
10 INJECTION INTRAMUSCULAR; INTRAVENOUS; SUBCUTANEOUS
Refills: 0 | Status: DISCONTINUED | OUTPATIENT
Start: 2020-01-01 | End: 2020-01-01

## 2020-01-01 RX ORDER — CEFTRIAXONE 500 MG/1
1000 INJECTION, POWDER, FOR SOLUTION INTRAMUSCULAR; INTRAVENOUS ONCE
Refills: 0 | Status: COMPLETED | OUTPATIENT
Start: 2020-01-01 | End: 2020-01-01

## 2020-01-01 RX ORDER — PANTOPRAZOLE SODIUM 20 MG/1
40 TABLET, DELAYED RELEASE ORAL DAILY
Refills: 0 | Status: DISCONTINUED | OUTPATIENT
Start: 2020-01-01 | End: 2020-01-01

## 2020-01-01 RX ORDER — ATORVASTATIN CALCIUM 80 MG/1
80 TABLET, FILM COATED ORAL AT BEDTIME
Refills: 0 | Status: DISCONTINUED | OUTPATIENT
Start: 2020-01-01 | End: 2020-01-01

## 2020-01-01 RX ORDER — EXENATIDE 250 UG/ML
2 INJECTION SUBCUTANEOUS
Qty: 0 | Refills: 0 | DISCHARGE

## 2020-01-01 RX ORDER — CHLORHEXIDINE GLUCONATE 213 G/1000ML
1 SOLUTION TOPICAL
Refills: 0 | Status: DISCONTINUED | OUTPATIENT
Start: 2020-01-01 | End: 2020-01-01

## 2020-01-01 RX ORDER — ANAKINRA 100MG/0.67
100 SYRINGE (ML) SUBCUTANEOUS EVERY 6 HOURS
Refills: 0 | Status: DISCONTINUED | OUTPATIENT
Start: 2020-01-01 | End: 2020-01-01

## 2020-01-01 RX ORDER — HYDROXYCHLOROQUINE SULFATE 200 MG
TABLET ORAL
Refills: 0 | Status: DISCONTINUED | OUTPATIENT
Start: 2020-01-01 | End: 2020-01-01

## 2020-01-01 RX ORDER — GABAPENTIN 400 MG/1
1 CAPSULE ORAL
Qty: 0 | Refills: 0 | DISCHARGE

## 2020-01-01 RX ORDER — PIOGLITAZONE HYDROCHLORIDE 15 MG/1
1 TABLET ORAL
Qty: 0 | Refills: 0 | DISCHARGE

## 2020-01-01 RX ORDER — AZITHROMYCIN 500 MG/1
TABLET, FILM COATED ORAL
Refills: 0 | Status: DISCONTINUED | OUTPATIENT
Start: 2020-01-01 | End: 2020-01-01

## 2020-01-01 RX ORDER — POTASSIUM PHOSPHATE, MONOBASIC POTASSIUM PHOSPHATE, DIBASIC 236; 224 MG/ML; MG/ML
15 INJECTION, SOLUTION INTRAVENOUS ONCE
Refills: 0 | Status: COMPLETED | OUTPATIENT
Start: 2020-01-01 | End: 2020-01-01

## 2020-01-01 RX ORDER — AZITHROMYCIN 500 MG/1
500 TABLET, FILM COATED ORAL ONCE
Refills: 0 | Status: DISCONTINUED | OUTPATIENT
Start: 2020-01-01 | End: 2020-01-01

## 2020-01-01 RX ORDER — POLYETHYLENE GLYCOL 3350 17 G/17G
17 POWDER, FOR SOLUTION ORAL DAILY
Refills: 0 | Status: DISCONTINUED | OUTPATIENT
Start: 2020-01-01 | End: 2020-01-01

## 2020-01-01 RX ORDER — APIXABAN 2.5 MG/1
5 TABLET, FILM COATED ORAL EVERY 12 HOURS
Refills: 0 | Status: DISCONTINUED | OUTPATIENT
Start: 2020-01-01 | End: 2020-01-01

## 2020-01-01 RX ORDER — CHLORHEXIDINE GLUCONATE 213 G/1000ML
15 SOLUTION TOPICAL EVERY 12 HOURS
Refills: 0 | Status: DISCONTINUED | OUTPATIENT
Start: 2020-01-01 | End: 2020-01-01

## 2020-01-01 RX ORDER — CHLORHEXIDINE GLUCONATE 213 G/1000ML
1 SOLUTION TOPICAL DAILY
Refills: 0 | Status: DISCONTINUED | OUTPATIENT
Start: 2020-01-01 | End: 2020-01-01

## 2020-01-01 RX ORDER — DEXTROSE 50 % IN WATER 50 %
50 SYRINGE (ML) INTRAVENOUS ONCE
Refills: 0 | Status: COMPLETED | OUTPATIENT
Start: 2020-01-01 | End: 2020-01-01

## 2020-01-01 RX ORDER — TAMSULOSIN HYDROCHLORIDE 0.4 MG/1
0.4 CAPSULE ORAL AT BEDTIME
Refills: 0 | Status: DISCONTINUED | OUTPATIENT
Start: 2020-01-01 | End: 2020-01-01

## 2020-01-01 RX ADMIN — PROPOFOL 50 MILLIGRAM(S): 10 INJECTION, EMULSION INTRAVENOUS at 20:30

## 2020-01-01 RX ADMIN — Medication 40 MILLIGRAM(S): at 05:50

## 2020-01-01 RX ADMIN — Medication 12: at 11:20

## 2020-01-01 RX ADMIN — CHLORHEXIDINE GLUCONATE 15 MILLILITER(S): 213 SOLUTION TOPICAL at 05:28

## 2020-01-01 RX ADMIN — Medication 40 MILLIGRAM(S): at 05:28

## 2020-01-01 RX ADMIN — Medication 4: at 13:17

## 2020-01-01 RX ADMIN — PANTOPRAZOLE SODIUM 40 MILLIGRAM(S): 20 TABLET, DELAYED RELEASE ORAL at 12:00

## 2020-01-01 RX ADMIN — Medication 40 MILLIGRAM(S): at 18:18

## 2020-01-01 RX ADMIN — Medication 80 MILLIGRAM(S): at 06:53

## 2020-01-01 RX ADMIN — INSULIN GLARGINE 15 UNIT(S): 100 INJECTION, SOLUTION SUBCUTANEOUS at 09:16

## 2020-01-01 RX ADMIN — CHLORHEXIDINE GLUCONATE 1 APPLICATION(S): 213 SOLUTION TOPICAL at 11:10

## 2020-01-01 RX ADMIN — ATORVASTATIN CALCIUM 80 MILLIGRAM(S): 80 TABLET, FILM COATED ORAL at 06:04

## 2020-01-01 RX ADMIN — CHLORHEXIDINE GLUCONATE 15 MILLILITER(S): 213 SOLUTION TOPICAL at 03:51

## 2020-01-01 RX ADMIN — Medication 650 MILLIGRAM(S): at 18:32

## 2020-01-01 RX ADMIN — CHLORHEXIDINE GLUCONATE 15 MILLILITER(S): 213 SOLUTION TOPICAL at 05:09

## 2020-01-01 RX ADMIN — SODIUM CHLORIDE 75 MILLILITER(S): 9 INJECTION, SOLUTION INTRAVENOUS at 18:45

## 2020-01-01 RX ADMIN — Medication 40 MILLIGRAM(S): at 19:23

## 2020-01-01 RX ADMIN — Medication 50 MILLILITER(S): at 17:45

## 2020-01-01 RX ADMIN — Medication 6: at 18:23

## 2020-01-01 RX ADMIN — Medication 2: at 00:26

## 2020-01-01 RX ADMIN — ENOXAPARIN SODIUM 70 MILLIGRAM(S): 100 INJECTION SUBCUTANEOUS at 12:00

## 2020-01-01 RX ADMIN — Medication 5 UNIT(S): at 18:34

## 2020-01-01 RX ADMIN — Medication 100 MILLIGRAM(S): at 06:53

## 2020-01-01 RX ADMIN — Medication 6: at 18:16

## 2020-01-01 RX ADMIN — POTASSIUM PHOSPHATE, MONOBASIC POTASSIUM PHOSPHATE, DIBASIC 62.5 MILLIMOLE(S): 236; 224 INJECTION, SOLUTION INTRAVENOUS at 11:30

## 2020-01-01 RX ADMIN — CHLORHEXIDINE GLUCONATE 15 MILLILITER(S): 213 SOLUTION TOPICAL at 18:06

## 2020-01-01 RX ADMIN — PROPOFOL 4.2 MICROGRAM(S)/KG/MIN: 10 INJECTION, EMULSION INTRAVENOUS at 21:05

## 2020-01-01 RX ADMIN — SODIUM CHLORIDE 100 MILLILITER(S): 9 INJECTION, SOLUTION INTRAVENOUS at 22:38

## 2020-01-01 RX ADMIN — CHLORHEXIDINE GLUCONATE 15 MILLILITER(S): 213 SOLUTION TOPICAL at 05:50

## 2020-01-01 RX ADMIN — PROPOFOL 4.2 MICROGRAM(S)/KG/MIN: 10 INJECTION, EMULSION INTRAVENOUS at 22:38

## 2020-01-01 RX ADMIN — PROPOFOL 50 MILLIGRAM(S): 10 INJECTION, EMULSION INTRAVENOUS at 17:00

## 2020-01-01 RX ADMIN — ENOXAPARIN SODIUM 70 MILLIGRAM(S): 100 INJECTION SUBCUTANEOUS at 11:15

## 2020-01-01 RX ADMIN — PROPOFOL 4.2 MICROGRAM(S)/KG/MIN: 10 INJECTION, EMULSION INTRAVENOUS at 06:42

## 2020-01-01 RX ADMIN — PANTOPRAZOLE SODIUM 40 MILLIGRAM(S): 20 TABLET, DELAYED RELEASE ORAL at 11:16

## 2020-01-01 RX ADMIN — Medication 4: at 18:05

## 2020-01-01 RX ADMIN — INSULIN GLARGINE 10 UNIT(S): 100 INJECTION, SOLUTION SUBCUTANEOUS at 11:36

## 2020-01-01 RX ADMIN — Medication 40 MILLIGRAM(S): at 17:29

## 2020-01-01 RX ADMIN — PROPOFOL 2.1 MICROGRAM(S)/KG/MIN: 10 INJECTION, EMULSION INTRAVENOUS at 16:55

## 2020-01-01 RX ADMIN — Medication 100 MILLIGRAM(S): at 17:27

## 2020-01-01 RX ADMIN — CEFTRIAXONE 100 MILLIGRAM(S): 500 INJECTION, POWDER, FOR SOLUTION INTRAMUSCULAR; INTRAVENOUS at 20:40

## 2020-01-01 RX ADMIN — Medication 40 MILLIGRAM(S): at 05:07

## 2020-01-01 RX ADMIN — Medication 8: at 06:00

## 2020-01-01 RX ADMIN — Medication 650 MILLIGRAM(S): at 01:32

## 2020-01-01 RX ADMIN — PROPOFOL 4.2 MICROGRAM(S)/KG/MIN: 10 INJECTION, EMULSION INTRAVENOUS at 01:07

## 2020-01-01 RX ADMIN — ETOMIDATE 20 MILLIGRAM(S): 2 INJECTION INTRAVENOUS at 16:25

## 2020-01-01 RX ADMIN — CHLORHEXIDINE GLUCONATE 15 MILLILITER(S): 213 SOLUTION TOPICAL at 06:04

## 2020-01-01 RX ADMIN — PROPOFOL 4.2 MICROGRAM(S)/KG/MIN: 10 INJECTION, EMULSION INTRAVENOUS at 01:47

## 2020-01-01 RX ADMIN — PHENYLEPHRINE HYDROCHLORIDE 5.25 MICROGRAM(S)/KG/MIN: 10 INJECTION INTRAVENOUS at 14:19

## 2020-01-01 RX ADMIN — ATORVASTATIN CALCIUM 80 MILLIGRAM(S): 80 TABLET, FILM COATED ORAL at 22:15

## 2020-01-01 RX ADMIN — PROPOFOL 4.2 MICROGRAM(S)/KG/MIN: 10 INJECTION, EMULSION INTRAVENOUS at 04:26

## 2020-01-01 RX ADMIN — TAMSULOSIN HYDROCHLORIDE 0.4 MILLIGRAM(S): 0.4 CAPSULE ORAL at 06:04

## 2020-01-01 RX ADMIN — PROPOFOL 4.2 MICROGRAM(S)/KG/MIN: 10 INJECTION, EMULSION INTRAVENOUS at 03:51

## 2020-01-01 RX ADMIN — Medication 100 MILLIGRAM(S): at 18:19

## 2020-01-01 RX ADMIN — Medication 100 MILLIGRAM(S): at 17:14

## 2020-01-01 RX ADMIN — CHLORHEXIDINE GLUCONATE 1 APPLICATION(S): 213 SOLUTION TOPICAL at 12:57

## 2020-01-01 RX ADMIN — Medication 2: at 18:33

## 2020-01-01 RX ADMIN — CHLORHEXIDINE GLUCONATE 15 MILLILITER(S): 213 SOLUTION TOPICAL at 18:42

## 2020-01-01 RX ADMIN — PROPOFOL 4.2 MICROGRAM(S)/KG/MIN: 10 INJECTION, EMULSION INTRAVENOUS at 20:01

## 2020-01-01 RX ADMIN — ATORVASTATIN CALCIUM 80 MILLIGRAM(S): 80 TABLET, FILM COATED ORAL at 21:03

## 2020-01-01 RX ADMIN — Medication 100 MILLIGRAM(S): at 05:08

## 2020-01-01 RX ADMIN — Medication 40 MILLIGRAM(S): at 17:14

## 2020-01-01 RX ADMIN — PANTOPRAZOLE SODIUM 40 MILLIGRAM(S): 20 TABLET, DELAYED RELEASE ORAL at 12:58

## 2020-01-01 RX ADMIN — Medication 10: at 12:00

## 2020-01-01 RX ADMIN — CHLORHEXIDINE GLUCONATE 15 MILLILITER(S): 213 SOLUTION TOPICAL at 04:49

## 2020-01-01 RX ADMIN — Medication 650 MILLIGRAM(S): at 05:47

## 2020-01-01 RX ADMIN — PROPOFOL 4.2 MICROGRAM(S)/KG/MIN: 10 INJECTION, EMULSION INTRAVENOUS at 23:45

## 2020-01-01 RX ADMIN — Medication 100 MILLIGRAM(S): at 05:50

## 2020-01-01 RX ADMIN — Medication 650 MILLIGRAM(S): at 23:52

## 2020-01-01 RX ADMIN — HYDROMORPHONE HYDROCHLORIDE 0.5 MG/HR: 2 INJECTION INTRAMUSCULAR; INTRAVENOUS; SUBCUTANEOUS at 05:51

## 2020-01-01 RX ADMIN — ATORVASTATIN CALCIUM 80 MILLIGRAM(S): 80 TABLET, FILM COATED ORAL at 02:03

## 2020-01-01 RX ADMIN — POLYETHYLENE GLYCOL 3350 17 GRAM(S): 17 POWDER, FOR SOLUTION ORAL at 11:32

## 2020-01-01 RX ADMIN — Medication 2: at 22:40

## 2020-01-01 RX ADMIN — Medication 40 MILLIGRAM(S): at 17:28

## 2020-01-01 RX ADMIN — PROPOFOL 4.2 MICROGRAM(S)/KG/MIN: 10 INJECTION, EMULSION INTRAVENOUS at 09:29

## 2020-01-01 RX ADMIN — Medication 4: at 23:49

## 2020-01-01 RX ADMIN — DEXMEDETOMIDINE HYDROCHLORIDE IN 0.9% SODIUM CHLORIDE 3.5 MICROGRAM(S)/KG/HR: 4 INJECTION INTRAVENOUS at 15:15

## 2020-01-01 RX ADMIN — AMLODIPINE BESYLATE 10 MILLIGRAM(S): 2.5 TABLET ORAL at 12:47

## 2020-01-01 RX ADMIN — Medication 40 MILLIGRAM(S): at 05:08

## 2020-01-01 RX ADMIN — CHLORHEXIDINE GLUCONATE 15 MILLILITER(S): 213 SOLUTION TOPICAL at 18:17

## 2020-01-01 RX ADMIN — DEXMEDETOMIDINE HYDROCHLORIDE IN 0.9% SODIUM CHLORIDE 3.5 MICROGRAM(S)/KG/HR: 4 INJECTION INTRAVENOUS at 21:18

## 2020-01-01 RX ADMIN — ENOXAPARIN SODIUM 70 MILLIGRAM(S): 100 INJECTION SUBCUTANEOUS at 12:58

## 2020-01-01 RX ADMIN — Medication 5 UNIT(S): at 22:41

## 2020-01-01 RX ADMIN — ENOXAPARIN SODIUM 70 MILLIGRAM(S): 100 INJECTION SUBCUTANEOUS at 11:30

## 2020-01-01 RX ADMIN — CHLORHEXIDINE GLUCONATE 15 MILLILITER(S): 213 SOLUTION TOPICAL at 17:28

## 2020-01-01 RX ADMIN — CHLORHEXIDINE GLUCONATE 15 MILLILITER(S): 213 SOLUTION TOPICAL at 17:26

## 2020-01-01 RX ADMIN — Medication 6: at 00:54

## 2020-01-01 RX ADMIN — Medication 8: at 05:06

## 2020-01-01 RX ADMIN — CHLORHEXIDINE GLUCONATE 1 APPLICATION(S): 213 SOLUTION TOPICAL at 11:31

## 2020-01-01 RX ADMIN — PANTOPRAZOLE SODIUM 40 MILLIGRAM(S): 20 TABLET, DELAYED RELEASE ORAL at 11:10

## 2020-01-01 RX ADMIN — Medication 100 MILLIGRAM(S): at 11:15

## 2020-01-01 RX ADMIN — ENOXAPARIN SODIUM 70 MILLIGRAM(S): 100 INJECTION SUBCUTANEOUS at 23:00

## 2020-01-01 RX ADMIN — PROPOFOL 4.2 MICROGRAM(S)/KG/MIN: 10 INJECTION, EMULSION INTRAVENOUS at 08:13

## 2020-01-01 RX ADMIN — ENOXAPARIN SODIUM 70 MILLIGRAM(S): 100 INJECTION SUBCUTANEOUS at 11:10

## 2020-01-01 RX ADMIN — Medication 40 MILLIGRAM(S): at 17:27

## 2020-01-01 RX ADMIN — Medication 100 MILLIGRAM(S): at 05:07

## 2020-01-01 RX ADMIN — HYDROMORPHONE HYDROCHLORIDE 0.5 MG/HR: 2 INJECTION INTRAMUSCULAR; INTRAVENOUS; SUBCUTANEOUS at 21:05

## 2020-01-01 RX ADMIN — PROPOFOL 4.2 MICROGRAM(S)/KG/MIN: 10 INJECTION, EMULSION INTRAVENOUS at 05:25

## 2020-01-01 RX ADMIN — HYDROMORPHONE HYDROCHLORIDE 0.5 MG/HR: 2 INJECTION INTRAMUSCULAR; INTRAVENOUS; SUBCUTANEOUS at 21:16

## 2020-01-01 RX ADMIN — PROPOFOL 4.2 MICROGRAM(S)/KG/MIN: 10 INJECTION, EMULSION INTRAVENOUS at 14:19

## 2020-01-01 RX ADMIN — Medication 100 MILLIGRAM(S): at 16:25

## 2020-01-01 RX ADMIN — PANTOPRAZOLE SODIUM 40 MILLIGRAM(S): 20 TABLET, DELAYED RELEASE ORAL at 11:30

## 2020-01-01 RX ADMIN — Medication 4: at 12:22

## 2020-04-08 NOTE — ED PROCEDURE NOTE - CPROC ED TIME OUT STATEMENT1
“Patient's name, , procedure and correct site were confirmed during the [place] Timeout.”

## 2020-04-08 NOTE — ED PROVIDER NOTE - CARE PLAN
Principal Discharge DX:	Pneumonia of both lower lobes due to infectious organism  Secondary Diagnosis:	Respiratory distress

## 2020-04-08 NOTE — H&P ADULT - NSICDXPASTMEDICALHX_GEN_ALL_CORE_FT
PAST MEDICAL HISTORY:  Chronic obstructive pulmonary disease, unspecified COPD type     Chronic pulmonary embolism, unspecified pulmonary embolism type, unspecified whether acute cor pulmonale present     Diabetes mellitus of other type without complication     Hypertension, unspecified type

## 2020-04-08 NOTE — ED PROVIDER NOTE - PMH
Chronic obstructive pulmonary disease, unspecified COPD type    Chronic pulmonary embolism, unspecified pulmonary embolism type, unspecified whether acute cor pulmonale present    Diabetes mellitus of other type without complication    Hypertension, unspecified type

## 2020-04-08 NOTE — ED PROVIDER NOTE - OBJECTIVE STATEMENT
63 y/o man, h/o CAD, HTN, DM, COPD, chronic PE, BIB EMS from [facility] for respiratory distress, eval for COVID-19 suspicion, reported pulse ox of 80% on 100% NRB mask, arrived with no IV heplock access.  Fever to 102 F orally reported.  Baseline mental status reportedly is alert and oriented, follows instructions, but Pt arrived awake but lethargic.  Pt is on Eliquis.

## 2020-04-08 NOTE — H&P ADULT - HISTORY OF PRESENT ILLNESS
64/M with PMH of  CAD, HTN, DM, COPD, chronic PE on eliquis, BIB EMS from [facility] for respiratory distress, eval for COVID-19 suspicion, reported pulse ox of 80% on 100% NRB mask, Fever to 102 F orally as per NH papers. On arrival to ed patient noted altered and in respiratory distress - hypoxic on 100% NRB, patient was intubated for respiratory distress. patient was placed on MV 20/400/100%/15+, now sedated - saturating >95%.

## 2020-04-08 NOTE — ED ADULT NURSE NOTE - CHIEF COMPLAINT QUOTE
bib/ems notification sent from [facility] for respiratory distress , tachypnea, hypoxia

## 2020-04-08 NOTE — ED PROVIDER NOTE - PROGRESS NOTE DETAILS
I spoke with Pt's HCP, Uche Alvarez, [phone], and apprised him of Pt's condition and prognosis.   Informed Dr. Bolden, ICU resident, and she says to admit to ICU Dr. Cotto.

## 2020-04-08 NOTE — ED ADULT NURSE NOTE - NSIMPLEMENTINTERV_GEN_ALL_ED
Implemented All Fall Risk Interventions:  [place] to call system. Call bell, personal items and telephone within reach. Instruct patient to call for assistance. Room bathroom lighting operational. Non-slip footwear when patient is off stretcher. Physically safe environment: no spills, clutter or unnecessary equipment. Stretcher in lowest position, wheels locked, appropriate side rails in place. Provide visual cue, wrist band, yellow gown, etc. Monitor gait and stability. Monitor for mental status changes and reorient to person, place, and time. Review medications for side effects contributing to fall risk. Reinforce activity limits and safety measures with patient and family.

## 2020-04-08 NOTE — H&P ADULT - ASSESSMENT
64/M with PMH of  CAD, HTN, DM, COPD, chronic PE on eliquis, BIB EMS from [facility] for respiratory distress, eval for COVID-19 suspicion, reported pulse ox of 80% on 100% NRB mask, Fever to 102 F orally as per NH papers. On arrival to ed patient noted altered and in respiratory distress - hypoxic on 100% NRB, patient was intubated for respiratory distress. patient was placed on MV 20/400/100%/15+, now sedated - saturating >95%.     Assessment:  - Acute Hypoxic Respiratory Failure secondary to PNA R/O COVID 19  - DM  - CHANDAN  - chronic PE   - COPD     Plan:  Neuro:  - AAO*2-3 at baseline  - now sedated and intubated      CV:  - Hold BP medications  - Will start Vasopresor support if needed      Pulm:  - Acute Hypoxic Resp Failure secondary to PNA R/O COVID 19  - c/e MV,   - Bilateral Opacities concerning for COVID PNA  - Continue with empiric antibiotics, Rocephin, Zithromax for concern for secondary bacterial infection   - started on plaqunil   - Recommend D-dimer, Ferritin, LDH, T cell sunsets, Pro-calcitonin, G6pd level, ESR, CRP, HIV, HBV serologies   - Recommend monitor EKG for QT prolongation     # chronic PE:  on eliquis 5 BID at home   will start on full dose lovenox     ID:  - empiric CAP coverage: Zithromax and Ceftriaxone  - Check Blood Cultures  - Check U/C  - Check COVID Results    Nephro:  # CHANDAN:  BUN/Cr of 43/1.96 -unknown baseline   likely pre-renal insetting of sepsis   trend daily BMP     GI:  - npo for now   - gi ppx     Heme:  - no indication for transfusion   - monitor cbc daily     Endo:  #DM  - target CBG < 180  - FS q6 while NPO  - holding oral hypoglycemic agents   - add lantus to keep FS between 140-180  - follow HbA1c level    Prophy:  - full dose lovenox and protonix daily     Dispo:  - ICU

## 2020-04-08 NOTE — CHART NOTE - NSCHARTNOTEFT_GEN_A_CORE
Called pt HCP HoneyUche (close friend), [phone], updated patient's currently critical medical situation and very poor prognosis, Uche requested no chest compression, DNR, comfortable care, no invasive procedures and he would like to talk to palliative care team in morning and would like to come to visit pt if possible in am. Will f/u palliative consult in am. MOLST form signed in chart.

## 2020-04-08 NOTE — H&P ADULT - NSHPLABSRESULTS_GEN_ALL_CORE
Vital Signs Last 24 Hrs  T(C): 39.3 (08 Apr 2020 16:27), Max: 39.3 (08 Apr 2020 16:27)  T(F): 102.7 (08 Apr 2020 16:27), Max: 102.7 (08 Apr 2020 16:27)  HR: 99 (08 Apr 2020 18:01) (92 - 124)  BP: 119/79 (08 Apr 2020 18:01) (119/79 - 146/78)  BP(mean): --  RR: 20 (08 Apr 2020 18:01) (20 - 36)  SpO2: 98% (08 Apr 2020 18:01) (83% - 98%)                            12.5   8.58  )-----------( 135      ( 08 Apr 2020 17:07 )             38.9       04-08    136  |  102  |  43<H>  ----------------------------<  124<H>  4.8   |  15<L>  |  1.96<H>    Ca    8.8      08 Apr 2020 17:07    TPro  8.0  /  Alb  2.8<L>  /  TBili  2.5<H>  /  DBili  x   /  AST  125<H>  /  ALT  33  /  AlkPhos  62  04-08          ABG - ( 08 Apr 2020 17:48 )  pH, Arterial: 7.30  pH, Blood: x     /  pCO2: 32    /  pO2: 183   / HCO3: 15    / Base Excess: -9.8  /  SaO2: 99                      PT/INR - ( 08 Apr 2020 17:07 )   PT: 16.0 sec;   INR: 1.40 ratio         PTT - ( 08 Apr 2020 17:07 )  PTT:39.2 sec    Lactate Trend  04-08 @ 17:07 Lactate:12.2      CARDIAC MARKERS ( 08 Apr 2020 17:07 )  0.638 ng/mL / x     / x     / x     / x            CAPILLARY BLOOD GLUCOSE      POCT Blood Glucose.: 130 mg/dL (08 Apr 2020 16:23)        < from: Xray Chest 1 View-PORTABLE IMMEDIATE (04.08.20 @ 17:29) >      INTERPRETATION:  Views:1  Comparison: Unavailable at this time  History: Shortness of breath, cough and fever    There is mild scattered interstitial infiltrate consistent with pneumonia or pneumonitis without segmental consolidation, layering effusion or cavitation. The heart is normal in size.     There has been median sternotomy. The endotracheal tube is within 2 cm of the sreekanth. Nasogastric tube is coiled in the stomach.    < end of copied text >

## 2020-04-08 NOTE — H&P ADULT - ATTENDING COMMENTS
Patient is a 63 y/o male with flu like sx admitted from skilled nursing facility with chief complaint of SOB. PMH- CAD s/p sternotomy ? CABG vs CABG?valvular surgery, HTN, DM, COPD on eliquis for chronic PE.  CXR shows bilateral infiltrates and there was arterial desaturation in the ED which along with his tachypnea and respiratory distress lead to his intubation. ABG- 7.30/32/183 post intubation Fio2 100, PEEP 15 cmh2o. Consideration being given to coronavirus infection and a nasal specimen was sent for PCR.  Dx- severe pneumonia, acute respiratory failure with hypoxia, r/o COVID -19. The patient coded upon arrival to the ICU ( 6 n ) and the patient's health care proxy decided he should be DNR and minimal invasive procedures. Will continue supportive care and adjust ventilator settings ( decrease FiO2 ). He has a blood lactate of 12 mmol/l and will require some degree of volume resuscitation but will do this carefully given the degree of hypoxemia associated with what appears to be COVID pneumonia.   I reviewed all roentgenographic and laboratory data, nurses notes and discussed the case with the referring/ED physician. Critical care time 40 minutes. Patient is a 65 y/o male with flu like sx admitted from skilled nursing facility with chief complaint of SOB. PMH- CAD s/p sternotomy ? CABG vs CABG?valvular surgery, HTN, DM, COPD on eliquis for chronic PE.  CXR shows bilateral infiltrates and there was arterial desaturation in the ED which along with his tachypnea and respiratory distress lead to his intubation. ABG- 7.30/32/183 post intubation Fio2 100, PEEP 15 cmh2o. Consideration being given to coronavirus infection and a nasal specimen was sent for PCR.  Dx- severe pneumonia, acute respiratory failure with hypoxia, r/o COVID -19. The patient coded upon arrival to the ICU ( 6 n ) and the patient's health care proxy decided he should be DNR and minimal invasive procedures. Will continue supportive care and adjust ventilator settings ( decrease FiO2 ). He has a blood lactate of 12 mmol/l and will require some degree of volume resuscitation but will do this carefully given the degree of hypoxemia associated with what appears to be COVID pneumonia. IV propofol for sedation presently @ 30 deanna/kg/min. May need addition of a second unit perhaps an opiate for suppression of respiratory drive.  I reviewed all roentgenographic and laboratory data, nurses notes and discussed the case with the referring/ED physician. Critical care time 40 minutes.

## 2020-04-08 NOTE — ED ADULT NURSE NOTE - ED STAT RN HANDOFF DETAILS
admitted [facility] - intubated by DR RUIZ - ET tube 7,5 ,lip line 23 , v/s stable , endorsed ti next nurse in stable condition . admitted [facility] - intubated by DR RUIZ - ET tube 7,5 ,lip line 23 , v/s stable , endorsed ti next nurse  ANN in stable condition .

## 2020-04-08 NOTE — H&P ADULT - NSHPPHYSICALEXAM_GEN_ALL_CORE
· Constitutional	Well-developed, well nourished  · Eyes	conjuctivae clear  · ENMT	No oral lesions; no gross abnormalities  · Neck	No bruits; no thyromegaly or nodules   · Back	No deformity or limitation of movement   · Respiratory	Breath Sounds equal & clear to percussion & auscultation, no accessory muscle use  · Cardiovascular	Regular rate & rhythm, normal S1, S2; no murmurs, gallops or rubs; no S3, S4  · Gastrointestinal	Soft, non-tender, no hepatosplenomegaly, normal bowel sounds  · Extremities	No cyanosis, clubbing or edema   · Neurological	sedated

## 2020-04-08 NOTE — ED PROVIDER NOTE - CLINICAL SUMMARY MEDICAL DECISION MAKING FREE TEXT BOX
63 y/o man, h/o CAD, HTN, DM, COPD, chronic PE, BIB EMS from [facility] for respiratory distress, eval for COVID-19 suspicion, reported pulse ox of 80% on 100% NRB mask, with fever--suspect COVID with respiratory failure, likely PNA--Intubated upon arrival, CXR, EKG, labs, admit to ICU.

## 2020-04-09 NOTE — CONSULT NOTE ADULT - PROBLEM SELECTOR RECOMMENDATION 2
Likely 2/2 sepsis, COVID 19 infection. Gentle IVF as tolerated. Monitor I/Os. Consider renal eval if worsens.

## 2020-04-09 NOTE — CONSULT NOTE ADULT - PROBLEM SELECTOR RECOMMENDATION 5
Spoke with patient's best friend/HCP Uche Méndez  regarding current condition, overall goals of care. He expressed that at baseline patient is alert and had been progressing w PT since hospitalization in Feb. He has known him for over 30 yrs and does not want him to suffer. Confirmed DNR. He does want to continue with current medical management for now and will reevaluate in the next 2-3 days if no signs of improvement. He was very emotional. Support provided.

## 2020-04-09 NOTE — CONSULT NOTE ADULT - PROBLEM SELECTOR RECOMMENDATION 9
2/2 COVID 19 PNA, ARDS, intubated/sedated. per chart note, s/p cardiac arrest.  On anakinra, steroids. Not req pressors at present, BP borderline. Mgmt per ICU  DNR

## 2020-04-09 NOTE — CONSULT NOTE ADULT - SUBJECTIVE AND OBJECTIVE BOX
HPI:  64/M with PMH of  CAD, HTN, DM, COPD, chronic PE on eliquis, BIB EMS from [facility] for respiratory distress, eval for COVID-19 suspicion, reported pulse ox of 80% on 100% NRB mask, Fever to 102 F orally as per NH papers. On arrival to ed patient noted altered and in respiratory distress - hypoxic on 100% NRB, patient was intubated for respiratory distress. patient was placed on MV 20/400/100%/15+, now sedated - saturating >95%. (08 Apr 2020 19:08)    Interval history: per chart notes, pt had brief cardiac arrest upon arrival to ICU, ROSC time not specified, currently remains intubated/sedated under ICU care, not on  pressors. DNR on file.       PAST MEDICAL & SURGICAL HISTORY:  Chronic pulmonary embolism, unspecified pulmonary embolism type, unspecified whether acute cor pulmonale present  Chronic obstructive pulmonary disease, unspecified COPD type  Hypertension, unspecified type  Diabetes mellitus of other type without complication  No significant past surgical history      SOCIAL HISTORY:  has children in [place], partner/best friend is HCP  Admitted from: [facility]        Surrogate/HCP/Guardian:    Uche Santa       Phone#: [phone]    FAMILY HISTORY:  No pertinent family history in first degree relatives    Baseline ADLs (prior to admission): alert, ambulatory w assist    Allergies    No Known Allergies    Intolerances      Present Symptoms:      Review of Systems:   Unable to obtain due to poor mentation     MEDICATIONS  (STANDING):  anakinra Injectable 100 milliGRAM(s) SubCutaneous every 12 hours  atorvastatin 80 milliGRAM(s) Oral at bedtime  chlorhexidine 0.12% Liquid 15 milliLiter(s) Oral Mucosa every 12 hours  HYDROmorphone Infusion. 0.5 mG/Hr (0.5 mL/Hr) IV Continuous <Continuous>  insulin lispro (HumaLOG) corrective regimen sliding scale   SubCutaneous every 6 hours  methylPREDNISolone sodium succinate Injectable 40 milliGRAM(s) IV Push every 12 hours  pantoprazole  Injectable 40 milliGRAM(s) IV Push daily    MEDICATIONS  (PRN):      PHYSICAL EXAM:    Vital Signs Last 24 Hrs  T(C): 37.2 (09 Apr 2020 11:17), Max: 39.3 (08 Apr 2020 16:27)  T(F): 98.9 (09 Apr 2020 11:17), Max: 102.7 (08 Apr 2020 16:27)  HR: 93 (09 Apr 2020 12:00) (83 - 124)  BP: 99/71 (09 Apr 2020 12:00) (98/72 - 159/90)  BP(mean): 85 (09 Apr 2020 04:00) (85 - 85)  RR: 22 (09 Apr 2020 12:00) (20 - 36)  SpO2: 100% (09 Apr 2020 12:00) (83% - 100%)     Karnofsky Performance Score/Palliative Performance Status Version2: 10    %  General : intubated/sedated, NAD  Not examined due to COVID status     LABS:                        11.9   9.92  )-----------( 110      ( 09 Apr 2020 06:10 )             37.6     04-09    137  |  103  |  50<H>  ----------------------------<  92  4.7   |  20<L>  |  2.45<H>    Ca    8.5      09 Apr 2020 06:10  Phos  6.6     04-09  Mg     2.1     04-09    TPro  7.2  /  Alb  2.5<L>  /  TBili  2.2<H>  /  DBili  x   /  AST  170<H>  /  ALT  45  /  AlkPhos  51  04-09        RADIOLOGY & ADDITIONAL STUDIES:    ADVANCE DIRECTIVES:

## 2020-04-09 NOTE — PROGRESS NOTE ADULT - ASSESSMENT
65 yo male with Covid, respiratory failure.  Currently intubated. Health care proxy at this point is asking for no chest compressions. Will discuss care with palliative care

## 2020-04-09 NOTE — PROGRESS NOTE ADULT - SUBJECTIVE AND OBJECTIVE BOX
64/M with PMH of  CAD, HTN, DM, COPD, chronic PE on eliquis, BIB EMS from [facility] for respiratory distress, Intubated in emergency room for hypoxia.  Covid +  Mode: AC/ CMV (Assist Control/ Continuous Mandatory Ventilation)  RR (machine): 20  TV (machine): 400  FiO2: 80  PEEP: 15  PS: 15  ITime: 1  MAP: 20  PIP: 32  ABG - ( 08 Apr 2020 17:48 )  pH, Arterial: 7.30  pH, Blood: x     /  pCO2: 32    /  pO2: 183   / HCO3: 15    / Base Excess: -9.8  /  SaO2: 99        MEDICATIONS  (STANDING):  anakinra Injectable 100 milliGRAM(s) SubCutaneous every 6 hours  atorvastatin 80 milliGRAM(s) Oral at bedtime  chlorhexidine 0.12% Liquid 15 milliLiter(s) Oral Mucosa every 12 hours  enoxaparin Injectable 70 milliGRAM(s) SubCutaneous daily  HYDROmorphone Infusion. 0.5 mG/Hr (0.5 mL/Hr) IV Continuous <Continuous>  insulin lispro (HumaLOG) corrective regimen sliding scale   SubCutaneous every 6 hours  methylPREDNISolone sodium succinate Injectable 40 milliGRAM(s) IV Push every 12 hours  pantoprazole  Injectable 40 milliGRAM(s) IV Push daily                     11.9   9.92  )-----------( 110      ( 09 Apr 2020 06:10 )             37.6     04-09    137  |  103  |  50<H>  ----------------------------<  92  4.7   |  20<L>  |  2.45<H>    Ca    8.5      09 Apr 2020 06:10  Phos  6.6     04-09  Mg     2.1     04-09    TPro  7.2  /  Alb  2.5<L>  /  TBili  2.2<H>  /  DBili  x   /  AST  170<H>  /  ALT  45  /  AlkPhos  51  04-09  PHYSICAL EXAM:  I&O's Detail    08 Apr 2020 07:01  -  09 Apr 2020 07:00  --------------------------------------------------------  IN:    HYDROmorphone Infusion.: 1.6 mL    IV PiggyBack: 10 mL  Total IN: 11.6 mL    OUT:    Indwelling Catheter - Urethral: 200 mL  Total OUT: 200 mL    Total NET: -188.4 mL      09 Apr 2020 07:01  -  09 Apr 2020 11:56  --------------------------------------------------------  IN:    HYDROmorphone Infusion.: 4.8 mL    IV PiggyBack: 30 mL  Total IN: 34.8 mL    OUT:  Total OUT: 0 mL    Total NET: 34.8 mL          Constitutional: Sedated, Intubated  Respiratory: decreased lung sounds bilaterally, Intubated  Cardiovascular: Irregularly irregular  Gastrointestinal: ND, +BS  Genitourinary: Mooney in place, Minimal urine  Extremities: mild lower extremity edema +1  Skin: warm dry and intact 64/M with PMH of  CAD, HTN, DM, COPD, chronic PE on eliquis, BIB EMS from [facility] for respiratory distress, Intubated in emergency room for hypoxia.  Covid +  Mode: AC/ CMV (Assist Control/ Continuous Mandatory Ventilation)  RR (machine): 20  TV (machine): 400  FiO2: 80  PEEP: 15  PS: 15  ITime: 1  MAP: 20  PIP: 32  ABG - ( 08 Apr 2020 17:48 )  pH, Arterial: 7.30  pH, Blood: x     /  pCO2: 32    /  pO2: 183   / HCO3: 15    / Base Excess: -9.8  /  SaO2: 99      T(C): 37.2 (04-09-20 @ 11:17), Max: 39.3 (04-08-20 @ 16:27)  HR: 83 (04-09-20 @ 08:54) (83 - 124)  BP: 98/72 (04-09-20 @ 08:00) (98/72 - 159/90)  RR: 22 (04-09-20 @ 08:00) (20 - 36)  SpO2: 100% (04-09-20 @ 08:54) (83% - 100%)  04-08-20 @ 07:01  -  04-09-20 @ 07:00  --------------------------------------------------------  IN: 11.6 mL / OUT: 200 mL / NET: -188.4 mL    04-09-20 @ 07:01  -  04-09-20 @ 12:05  --------------------------------------------------------  IN: 34.8 mL / OUT: 0 mL / NET: 34.8 mL    anakinra Injectable 100 milliGRAM(s) SubCutaneous every 6 hours  atorvastatin 80 milliGRAM(s) Oral at bedtime  chlorhexidine 0.12% Liquid 15 milliLiter(s) Oral Mucosa every 12 hours  enoxaparin Injectable 70 milliGRAM(s) SubCutaneous daily  HYDROmorphone Infusion. 0.5 mG/Hr IV Continuous <Continuous>  insulin lispro (HumaLOG) corrective regimen sliding scale   SubCutaneous every 6 hours  methylPREDNISolone sodium succinate Injectable 40 milliGRAM(s) IV Push every 12 hours  pantoprazole  Injectable 40 milliGRAM(s) IV Push daily  Mode: AC/ CMV (Assist Control/ Continuous Mandatory Ventilation), RR (machine): 20, TV (machine): 400, FiO2: 80, PEEP: 15, PS: 15, ITime: 1, MAP: 20, PIP: 32    MEDICATIONS  (STANDING):  anakinra Injectable 100 milliGRAM(s) SubCutaneous every 6 hours  atorvastatin 80 milliGRAM(s) Oral at bedtime  chlorhexidine 0.12% Liquid 15 milliLiter(s) Oral Mucosa every 12 hours  enoxaparin Injectable 70 milliGRAM(s) SubCutaneous daily  HYDROmorphone Infusion. 0.5 mG/Hr (0.5 mL/Hr) IV Continuous <Continuous>  insulin lispro (HumaLOG) corrective regimen sliding scale   SubCutaneous every 6 hours  methylPREDNISolone sodium succinate Injectable 40 milliGRAM(s) IV Push every 12 hours  pantoprazole  Injectable 40 milliGRAM(s) IV Push daily                     11.9   9.92  )-----------( 110      ( 09 Apr 2020 06:10 )             37.6     04-09    137  |  103  |  50<H>  ----------------------------<  92  4.7   |  20<L>  |  2.45<H>    Ca    8.5      09 Apr 2020 06:10  Phos  6.6     04-09  Mg     2.1     04-09    TPro  7.2  /  Alb  2.5<L>  /  TBili  2.2<H>  /  DBili  x   /  AST  170<H>  /  ALT  45  /  AlkPhos  51  04-09  PHYSICAL EXAM:  I&O's Detail    08 Apr 2020 07:01  -  09 Apr 2020 07:00  --------------------------------------------------------  IN:    HYDROmorphone Infusion.: 1.6 mL    IV PiggyBack: 10 mL  Total IN: 11.6 mL    OUT:    Indwelling Catheter - Urethral: 200 mL  Total OUT: 200 mL    Total NET: -188.4 mL      09 Apr 2020 07:01  -  09 Apr 2020 11:56  --------------------------------------------------------  IN:    HYDROmorphone Infusion.: 4.8 mL    IV PiggyBack: 30 mL  Total IN: 34.8 mL    OUT:  Total OUT: 0 mL    Total NET: 34.8 mL          Constitutional: Sedated, Intubated  Respiratory: decreased lung sounds bilaterally, Intubated  Cardiovascular: Irregularly irregular  Gastrointestinal: ND, +BS  Genitourinary: Mooney in place, Minimal urine  Extremities: mild lower extremity edema +1  Skin: warm dry and intact

## 2020-04-09 NOTE — CONSULT NOTE ADULT - PROBLEM SELECTOR RECOMMENDATION 3
Ambulatory w assist at baseline, had been at [facility] since recent hospitalization for leg infection in Feb

## 2020-04-10 NOTE — DIETITIAN INITIAL EVALUATION ADULT. - PERTINENT MEDS FT
MEDICATIONS  (STANDING):  anakinra Injectable 100 milliGRAM(s) SubCutaneous every 12 hours  atorvastatin 80 milliGRAM(s) Oral at bedtime  chlorhexidine 0.12% Liquid 15 milliLiter(s) Oral Mucosa every 12 hours  enoxaparin Injectable 70 milliGRAM(s) SubCutaneous daily  HYDROmorphone Infusion. 0.5 mG/Hr (0.5 mL/Hr) IV Continuous <Continuous>  insulin lispro (HumaLOG) corrective regimen sliding scale   SubCutaneous every 6 hours  methylPREDNISolone sodium succinate Injectable 40 milliGRAM(s) IV Push every 12 hours  pantoprazole  Injectable 40 milliGRAM(s) IV Push daily  phenylephrine    Infusion 0.4 MICROgram(s)/kG/Min (5.25 mL/Hr) IV Continuous <Continuous>  propofol Infusion 10 MICROgram(s)/kG/Min (4.2 mL/Hr) IV Continuous <Continuous>    MEDICATIONS  (PRN):  acetaminophen   Tablet .. 650 milliGRAM(s) Oral every 6 hours PRN Temp greater or equal to 38C (100.4F)

## 2020-04-10 NOTE — DIETITIAN INITIAL EVALUATION ADULT. - PERTINENT LABORATORY DATA
04-10 Na139 mmol/L Glu 124 mg/dL<H> K+ 4.6 mmol/L Cr  3.07 mg/dL<H> BUN 76 mg/dL<H> 04-10 Phos 6.3 mg/dL<H> 04-10 Alb 2.2 g/dL<L> 04-09 [id number] 4.9 %

## 2020-04-10 NOTE — PROGRESS NOTE ADULT - SUBJECTIVE AND OBJECTIVE BOX
INTERVAL HPI/OVERNIGHT EVENTS:     PRESSORS: [ ] YES [ ] NO    Antimicrobial:    Cardiovascular:    Pulmonary:    Hematalogic:  enoxaparin Injectable 70 milliGRAM(s) SubCutaneous daily    Other:  acetaminophen   Tablet .. 650 milliGRAM(s) Oral every 6 hours PRN  anakinra Injectable 100 milliGRAM(s) SubCutaneous every 12 hours  atorvastatin 80 milliGRAM(s) Oral at bedtime  chlorhexidine 0.12% Liquid 15 milliLiter(s) Oral Mucosa every 12 hours  HYDROmorphone Infusion. 0.5 mG/Hr IV Continuous <Continuous>  insulin lispro (HumaLOG) corrective regimen sliding scale   SubCutaneous every 6 hours  methylPREDNISolone sodium succinate Injectable 40 milliGRAM(s) IV Push every 12 hours  pantoprazole  Injectable 40 milliGRAM(s) IV Push daily    acetaminophen   Tablet .. 650 milliGRAM(s) Oral every 6 hours PRN  anakinra Injectable 100 milliGRAM(s) SubCutaneous every 12 hours  atorvastatin 80 milliGRAM(s) Oral at bedtime  chlorhexidine 0.12% Liquid 15 milliLiter(s) Oral Mucosa every 12 hours  enoxaparin Injectable 70 milliGRAM(s) SubCutaneous daily  HYDROmorphone Infusion. 0.5 mG/Hr IV Continuous <Continuous>  insulin lispro (HumaLOG) corrective regimen sliding scale   SubCutaneous every 6 hours  methylPREDNISolone sodium succinate Injectable 40 milliGRAM(s) IV Push every 12 hours  pantoprazole  Injectable 40 milliGRAM(s) IV Push daily    Drug Dosing Weight    Weight (kg): 70 (08 Apr 2020 16:44)    CENTRAL LINE: [ ] YES [ ] NO  LOCATION:   DATE INSERTED:  REMOVE: [ ] YES [ ] NO  EXPLAIN:    PATRICK: [ ] YES [ ] NO    DATE INSERTED:  REMOVE:  [ ] YES [ ] NO  EXPLAIN:    A-LINE:  [ ] YES [ ] NO  LOCATION:   DATE INSERTED:  REMOVE:  [ ] YES [ ] NO  EXPLAIN:    PMH -reviewed admission note, no change since admission      ABG - ( 10 Apr 2020 05:07 )  pH, Arterial: 7.37  pH, Blood: x     /  pCO2: 38    /  pO2: 152   / HCO3: 22    / Base Excess: -2.9  /  SaO2: 99                    04-09 @ 07:01  -  04-10 @ 07:00  --------------------------------------------------------  IN: 576.8 mL / OUT: 305 mL / NET: 271.8 mL        Mode: AC/ CMV (Assist Control/ Continuous Mandatory Ventilation)  RR (machine): 20  TV (machine): 400  FiO2: 80  PEEP: 15  ITime: 1  MAP: 20  PIP: 31      PHYSICAL EXAM:               Constitutional	Well-developed, well nourished  	· Eyes	conjuctivae clear  	· ENMT	No oral lesions; no gross abnormalities  	· Neck	No bruits; no thyromegaly or nodules   	· Back	No deformity or limitation of movement   	· Respiratory	Breath Sounds equal & clear to percussion & auscultation, no accessory muscle use  	· Cardiovascular	Regular rate & rhythm, normal S1, S2; no murmurs, gallops or rubs; no S3, S4  	· Gastrointestinal	Soft, non-tender, no hepatosplenomegaly, normal bowel sounds  	· Extremities	No cyanosis, clubbing or edema   ·             Neurological	sedated      LABS:  CBC Full  -  ( 10 Apr 2020 06:25 )  WBC Count : 4.56 K/uL  RBC Count : 3.48 M/uL  Hemoglobin : 10.6 g/dL  Hematocrit : 33.7 %  Platelet Count - Automated : 132 K/uL  Mean Cell Volume : 96.8 fl  Mean Cell Hemoglobin : 30.5 pg  Mean Cell Hemoglobin Concentration : 31.5 gm/dL  Auto Neutrophil # : x  Auto Lymphocyte # : x  Auto Monocyte # : x  Auto Eosinophil # : x  Auto Basophil # : x  Auto Neutrophil % : x  Auto Lymphocyte % : x  Auto Monocyte % : x  Auto Eosinophil % : x  Auto Basophil % : x    04-10    139  |  105  |  76<H>  ----------------------------<  124<H>  4.6   |  23  |  3.07<H>    Ca    8.3<L>      10 Apr 2020 06:25  Phos  6.3     04-10  Mg     2.2     04-10    TPro  6.5  /  Alb  2.2<L>  /  TBili  2.0<H>  /  DBili  x   /  AST  796<H>  /  ALT  275<H>  /  AlkPhos  53  04-10    PT/INR - ( 09 Apr 2020 06:10 )   PT: 16.1 sec;   INR: 1.41 ratio         PTT - ( 09 Apr 2020 06:10 )  PTT:35.2 sec        RADIOLOGY & ADDITIONAL STUDIES REVIEWED:  ***    [ ]GOALS OF CARE DISCUSSION WITH PATIENT/FAMILY/PROXY:    CRITICAL CARE TIME SPENT: 35 minutes INTERVAL HPI/OVERNIGHT EVENTS: Pt had episode of PEA followed by V fib and A fib.   PRESSORS: [ ] YES [x ] NO    Antimicrobial:    Cardiovascular:    Pulmonary:    Hematalogic:  enoxaparin Injectable 70 milliGRAM(s) SubCutaneous daily    Other:  acetaminophen   Tablet .. 650 milliGRAM(s) Oral every 6 hours PRN  anakinra Injectable 100 milliGRAM(s) SubCutaneous every 12 hours  atorvastatin 80 milliGRAM(s) Oral at bedtime  chlorhexidine 0.12% Liquid 15 milliLiter(s) Oral Mucosa every 12 hours  HYDROmorphone Infusion. 0.5 mG/Hr IV Continuous <Continuous>  insulin lispro (HumaLOG) corrective regimen sliding scale   SubCutaneous every 6 hours  methylPREDNISolone sodium succinate Injectable 40 milliGRAM(s) IV Push every 12 hours  pantoprazole  Injectable 40 milliGRAM(s) IV Push daily    acetaminophen   Tablet .. 650 milliGRAM(s) Oral every 6 hours PRN  anakinra Injectable 100 milliGRAM(s) SubCutaneous every 12 hours  atorvastatin 80 milliGRAM(s) Oral at bedtime  chlorhexidine 0.12% Liquid 15 milliLiter(s) Oral Mucosa every 12 hours  enoxaparin Injectable 70 milliGRAM(s) SubCutaneous daily  HYDROmorphone Infusion. 0.5 mG/Hr IV Continuous <Continuous>  insulin lispro (HumaLOG) corrective regimen sliding scale   SubCutaneous every 6 hours  methylPREDNISolone sodium succinate Injectable 40 milliGRAM(s) IV Push every 12 hours  pantoprazole  Injectable 40 milliGRAM(s) IV Push daily    Drug Dosing Weight    Weight (kg): 70 (08 Apr 2020 16:44)    CENTRAL LINE: [ ] YES [ x] NO  LOCATION:   DATE INSERTED:  REMOVE: [ ] YES [ ] NO  EXPLAIN:    NGUYEN: [x ] YES [ ] NO    DATE INSERTED:  REMOVE:  [ ] YES [ ] NO  EXPLAIN:    A-LINE:  [ ] YES [x ] NO  LOCATION:   DATE INSERTED:  REMOVE:  [ ] YES [ ] NO  EXPLAIN:    PMH -reviewed admission note, no change since admission      ABG - ( 10 Apr 2020 05:07 )  pH, Arterial: 7.37  pH, Blood: x     /  pCO2: 38    /  pO2: 152   / HCO3: 22    / Base Excess: -2.9  /  SaO2: 99                    04-09 @ 07:01  -  04-10 @ 07:00  --------------------------------------------------------  IN: 576.8 mL / OUT: 305 mL / NET: 271.8 mL        Mode: AC/ CMV (Assist Control/ Continuous Mandatory Ventilation)  RR (machine): 20  TV (machine): 400  FiO2: 80  PEEP: 15  ITime: 1  MAP: 20  PIP: 31      PHYSICAL EXAM:               Constitutional	Well-developed, well nourished  	· Eyes	conjuctivae clear  	· ENMT	No oral lesions; no gross abnormalities  	· Neck	No bruits; no thyromegaly or nodules   	· Back	No deformity or limitation of movement   	· Respiratory	Breath Sounds equal & clear to percussion & auscultation, no accessory muscle use  	· Cardiovascular	Regular rate & rhythm, normal S1, S2; no murmurs, gallops or rubs; no S3, S4  	· Gastrointestinal	Soft, non-tender, no hepatosplenomegaly, normal bowel sounds  	· Extremities	No cyanosis, clubbing or edema   ·             Neurological	sedated      LABS:  CBC Full  -  ( 10 Apr 2020 06:25 )  WBC Count : 4.56 K/uL  RBC Count : 3.48 M/uL  Hemoglobin : 10.6 g/dL  Hematocrit : 33.7 %  Platelet Count - Automated : 132 K/uL  Mean Cell Volume : 96.8 fl  Mean Cell Hemoglobin : 30.5 pg  Mean Cell Hemoglobin Concentration : 31.5 gm/dL  Auto Neutrophil # : x  Auto Lymphocyte # : x  Auto Monocyte # : x  Auto Eosinophil # : x  Auto Basophil # : x  Auto Neutrophil % : x  Auto Lymphocyte % : x  Auto Monocyte % : x  Auto Eosinophil % : x  Auto Basophil % : x    04-10    139  |  105  |  76<H>  ----------------------------<  124<H>  4.6   |  23  |  3.07<H>    Ca    8.3<L>      10 Apr 2020 06:25  Phos  6.3     04-10  Mg     2.2     04-10    TPro  6.5  /  Alb  2.2<L>  /  TBili  2.0<H>  /  DBili  x   /  AST  796<H>  /  ALT  275<H>  /  AlkPhos  53  04-10    PT/INR - ( 09 Apr 2020 06:10 )   PT: 16.1 sec;   INR: 1.41 ratio         PTT - ( 09 Apr 2020 06:10 )  PTT:35.2 sec        RADIOLOGY & ADDITIONAL STUDIES REVIEWED:  ***    [ ]GOALS OF CARE DISCUSSION WITH PATIENT/FAMILY/PROXY:    CRITICAL CARE TIME SPENT: 35 minutes

## 2020-04-10 NOTE — DIETITIAN INITIAL EVALUATION ADULT. - ENTERAL
With Propofol @12.6 Nepro 20x24 + 1 Pkt Prosource BID (480 ml, 864 kcals, 39 gm protein,39 gm protein). 2 Prosource add 30 gm protein, 120 kca;yash. MD to monitor. RD available.

## 2020-04-10 NOTE — ACUTE INTERFACILITY TRANSFER NOTE - PULMONARY EMBOLUS
Past Medical History:   Diagnosis Date    Anticoagulant long-term use     aspirin    COPD (chronic obstructive pulmonary disease)     COPD with acute exacerbation 1/9/2015    Diabetes mellitus type II     Encounter for blood transfusion     Hip arthritis 3/1/2016    Hyperlipidemia     Hypertension     Pneumonia of right lower lobe due to infectious organism 9/11/2017    Thyroid disease     hypothyroid       Past Surgical History:   Procedure Laterality Date    APPENDECTOMY      FRACTURE SURGERY      right hip     HERNIA REPAIR      groin    HYSTERECTOMY      VARICOSE VEIN SURGERY         Review of patient's allergies indicates:   Allergen Reactions    Carvedilol Other (See Comments)     Bradycardia and syncope    Boniva [ibandronate]     Codeine     Hydralazine analogues     Iodinated contrast- oral and iv dye Hives    Lisinopril     Morphine        No current facility-administered medications on file prior to encounter.      Current Outpatient Prescriptions on File Prior to Encounter   Medication Sig    albuterol-ipratropium 2.5mg-0.5mg/3mL (DUO-NEB) 0.5 mg-3 mg(2.5 mg base)/3 mL nebulizer solution Take 3 mLs by nebulization every 6 (six) hours while awake. Rescue    amlodipine (NORVASC) 10 MG tablet Take 1 tablet (10 mg total) by mouth once daily.    ascorbic acid (VITAMIN C) 500 MG tablet Take 500 mg by mouth once daily.      aspirin (ECOTRIN) 81 MG EC tablet Take 81 mg by mouth once daily.      calcium carbonate (OS-TASIA) 500 mg calcium (1,250 mg) tablet Take 1 tablet by mouth 2 (two) times daily.      ferrous sulfate 325 mg (65 mg iron) Tab tablet Take 1 tablet (325 mg total) by mouth daily with breakfast.    fish oil-omega-3 fatty acids 300-1,000 mg capsule Take 2 g by mouth every evening.     fluticasone (FLONASE) 50 mcg/actuation nasal spray 1 spray by Each Nare route once daily. (Patient taking differently: 1 spray by Each Nare route daily as needed for Rhinitis or Allergies. )     gabapentin (NEURONTIN) 300 MG capsule Take 1 capsule (300 mg total) by mouth every evening.    guanfacine (TENEX) 1 MG Tab Take 1 mg by mouth every evening. Take 1 tablet by mouth in the evening    levothyroxine (SYNTHROID) 100 MCG tablet Take 1 tablet (100 mcg total) by mouth before breakfast.    losartan (COZAAR) 100 MG tablet Take 1 tablet (100 mg total) by mouth once daily.    magnesium oxide (MAG-OX) 400 mg tablet take by mouth once daily    nitroGLYCERIN (NITROSTAT) 0.4 MG SL tablet Place 1 tablet (0.4 mg total) under the tongue every 5 (five) minutes as needed for Chest pain. As needed    simvastatin (ZOCOR) 40 MG tablet TAKE ONE TABLET BY MOUTH ONCE DAILY IN THE EVENING    vitamin D 1000 units Tab Take 1 tablet (1,000 Units total) by mouth once daily.    cloNIDine (CATAPRES) 0.1 MG tablet Take 1 tablet (0.1 mg total) by mouth 2 (two) times daily. Take one tablet by mouth every hour as needed for systolic blood pressure greater than 180.  Do not take more than 3 tablets in 24 hours.     Family History     Problem Relation (Age of Onset)    Diabetes Sister, Brother    Hypertension Mother    Kidney disease Son        Social History Main Topics    Smoking status: Former Smoker     Years: 44.00     Types: Cigarettes     Quit date: 4/30/2015    Smokeless tobacco: Never Used      Comment: 1/08/2015    Alcohol use No    Drug use: No    Sexual activity: No     Review of Systems   Constitutional: Negative for chills and fever.   HENT: Negative for congestion and postnasal drip.    Eyes: Negative for visual disturbance.   Respiratory: Positive for cough and shortness of breath.    Cardiovascular: Negative for chest pain and palpitations.   Gastrointestinal: Negative for diarrhea, nausea and vomiting.   Genitourinary: Negative for dysuria.   Musculoskeletal: Negative for arthralgias.   Skin: Negative for wound.   Neurological: Negative for dizziness, syncope and headaches.   Psychiatric/Behavioral:  Negative for confusion and hallucinations.     Objective:     Vital Signs (Most Recent):  Temp: 97.6 °F (36.4 °C) (10/14/17 2037)  Pulse: 72 (10/14/17 2046)  Resp: (!) 21 (10/14/17 2037)  BP: (!) 165/70 (10/14/17 2037)  SpO2: 97 % (10/14/17 2105) Vital Signs (24h Range):  Temp:  [97.6 °F (36.4 °C)-97.8 °F (36.6 °C)] 97.6 °F (36.4 °C)  Pulse:  [63-80] 72  Resp:  [18-30] 21  SpO2:  [95 %-100 %] 97 %  BP: (143-221)/(66-89) 165/70     Weight: 60.8 kg (134 lb)  Body mass index is 23 kg/m².    Physical Exam   Constitutional: She is oriented to person, place, and time. No distress.   HENT:   Head: Normocephalic.   Eyes: Pupils are equal, round, and reactive to light.   Neck: Normal range of motion. Neck supple. No JVD present. No tracheal deviation present.   Cardiovascular: Normal rate, regular rhythm, normal heart sounds and intact distal pulses.    Pulmonary/Chest: Effort normal. No respiratory distress.   Mild rhonchi bilaterally. Diminished at the bases.   Abdominal: Soft. Bowel sounds are normal. She exhibits no distension. There is no tenderness.   Musculoskeletal: Normal range of motion. She exhibits no edema.   Generalized weakness   Neurological: She is alert and oriented to person, place, and time. No cranial nerve deficit.   Skin: Skin is warm and dry. Capillary refill takes less than 2 seconds.   Psychiatric: She has a normal mood and affect. Her behavior is normal. Judgment and thought content normal.        Significant Labs:   BMP:   Recent Labs  Lab 10/14/17  1711   *   *   K 5.5*   CL 97   CO2 24   BUN 36*   CREATININE 1.8*   CALCIUM 9.7     CBC:   Recent Labs  Lab 10/14/17  1711   WBC 8.20   HGB 10.9*   HCT 31.9*        Troponin:   Recent Labs  Lab 10/14/17  1711   TROPONINI <0.006       Significant Imaging:     CXR: Reviewed film, looks ok. Reviewed radiologist's report-- Impression    Little change from prior exam. Continued minimal consolidation right lung base.        no

## 2020-04-10 NOTE — PROCEDURE NOTE - NSPROCDETAILS_GEN_ALL_CORE
hemostasis with direct pressure, dressing applied/ultrasound guidance/positive blood return obtained via catheter/location identified, draped/prepped, sterile technique used, needle inserted/introduced/connected to a pressurized flush line/all materials/supplies accounted for at end of procedure/sutured in place

## 2020-04-10 NOTE — ACUTE INTERFACILITY TRANSFER NOTE - HOSPITAL COURSE
64/M with PMH of  CAD, HTN, DM, COPD, chronic PE on eliquis, BIB EMS from [facility] for respiratory distress, eval for COVID-19 suspicion, reported pulse ox of 80% on 100% NRB mask, Fever to 102 F orally as per NH papers.    Patient tested positive for COVID-19.    < from: Xray Chest 1 View- PORTABLE-Routine (04.09.20 @ 10:12) >  IMPRESSION: Increasing bilateral lung parenchymal airspace opacities. Distal tip of the indwelling ETT resides approximately 4.5 cm superior to the tracheal bifurcation.    < end of copied text >    Completed courses of Azithromycin, Rocephin and Plaquenil.  Patient currently on solumedrol.

## 2020-04-10 NOTE — PROGRESS NOTE ADULT - ASSESSMENT
acute hypoxic respiratory distress requiring intubation and sedation. Imaging c/w ARDS. Admitted to MICU for management.    #Sepsis 2/2 COVID+    #COVID+  - Anakinra   - daily CBC/CMP/Mg/Phos/CRP  - COVID isolation protocol    #Hypoxic respiratory failure 2/2 ARDS 2/2 COVID+  - CXR on arrival with BL multilobar interstitial opacities c/w ARDS 2/2 COVID+   intubated and sedated for increased work of breathing now breathing in sync with vent  - continue to trend ABG with vent adjustments    #ARDS  - as above  -  AB.37/38/152/22    Oxygen Saturation, Arterial: 99 %    Blood Gas Comments Arterial: left radial; AC 20/400/100%/+15  - low threshold to prone pt in order to improve oxygenation and secretion drainage  - continue to closely monitor pt  cardiac arrest on , ROSC achieved, now DNR,, family wants comfort care  history of AR, COPD resume apixaban   -will get ECG      #Sedation currently on fentanyl gtt and versed gtt   - RASS goal -4    #Sepsis 2/2 COVID+s  E: Replete K<4, Mg<2  N: NPO   G: protonix 40mg IV daily    Code: DNR acute hypoxic respiratory distress requiring intubation and sedation. Imaging c/w ARDS. Admitted to MICU for management.    #Sepsis 2/2 COVID+    #COVID+  - Anakinra   - daily CBC/CMP/Mg/Phos/CRP  - COVID isolation protocol    #Hypoxic respiratory failure 2/2 ARDS 2/2 COVID+  - CXR on arrival with BL multilobar interstitial opacities c/w ARDS 2/2 COVID+   intubated and sedated for increased work of breathing now breathing in sync with vent  - continue to trend ABG with vent adjustments    #ARDS  - as above  -  AB.37/38/152/22    Oxygen Saturation, Arterial: 99 %    Blood Gas Comments Arterial: left radial; AC 20/400/70%/+10  - low threshold to prone pt in order to improve oxygenation and secretion drainage  - continue to closely monitor pt    #cardiac arrest on , ROSC achieved, now DNR,, family wants comfort care  history of NC, COPD resume apixaban       #Sedation currently on fentanyl gtt and versed gtt   - RASS goal -4    #Sepsis 2/2 COVID+s  E: Replete K<4, Mg<2  N: NPO   G: protonix 40mg IV daily    Code: DNR

## 2020-04-10 NOTE — DIETITIAN INITIAL EVALUATION ADULT. - OTHER INFO
S/p Cardiac arrest 4/8. Unable to see due to Covid-19. Noted to be receiving Nepro @ 40 ml/hr. Noted to be on Propofol @ 12.6 ml/hr (if x 24 hrs=333 kcals fat. Discussed with PGYs

## 2020-04-11 NOTE — PROGRESS NOTE ADULT - ASSESSMENT
acute hypoxic respiratory distress requiring intubation and sedation. Imaging c/w ARDS. Admitted to MICU for management.    #Sepsis 2/2 COVID+    #COVID+  - Anakinra   - daily CBC/CMP/Mg/Phos/CRP  - COVID isolation protocol    #Hypoxic respiratory failure 2/2 ARDS 2/2 COVID+  - CXR on arrival with BL multilobar interstitial opacities c/w ARDS 2/2 COVID+   intubated and sedated for increased work of breathing now breathing in sync with vent  - continue to trend ABG with vent adjustments    #ARDS  - as above  -  AB.37/38/152/22    Oxygen Saturation, Arterial: 99 %    Blood Gas Comments Arterial: left radial; AC 20/400/70%/+10  - low threshold to prone pt in order to improve oxygenation and secretion drainage  - continue to closely monitor pt    #cardiac arrest on , ROSC achieved, now DNR,, family wants comfort care  history of LA, COPD resume apixaban       #Sedation currently on fentanyl gtt and versed gtt   - RASS goal -4    #Sepsis 2/2 COVID+s  E: Replete K<4, Mg<2  N: NPO   G: protonix 40mg IV daily

## 2020-04-11 NOTE — PROGRESS NOTE ADULT - SUBJECTIVE AND OBJECTIVE BOX
INTERVAL HPI/OVERNIGHT EVENTS: Increasing vent requirements    PRESSORS: NO    Antimicrobial:    Cardiovascular:  phenylephrine    Infusion 0.4 MICROgram(s)/kG/Min IV Continuous <Continuous>    Pulmonary:    Hematalogic:  enoxaparin Injectable 70 milliGRAM(s) SubCutaneous daily    Other:  acetaminophen   Tablet .. 650 milliGRAM(s) Oral every 6 hours PRN  anakinra Injectable 100 milliGRAM(s) SubCutaneous every 12 hours  atorvastatin 80 milliGRAM(s) Oral at bedtime  chlorhexidine 0.12% Liquid 15 milliLiter(s) Oral Mucosa every 12 hours  chlorhexidine 2% Cloths 1 Application(s) Topical daily  HYDROmorphone Infusion. 0.5 mG/Hr IV Continuous <Continuous>  insulin lispro (HumaLOG) corrective regimen sliding scale   SubCutaneous every 6 hours  methylPREDNISolone sodium succinate Injectable 40 milliGRAM(s) IV Push every 12 hours  pantoprazole  Injectable 40 milliGRAM(s) IV Push daily  propofol Infusion 10 MICROgram(s)/kG/Min IV Continuous <Continuous>  sodium chloride 0.9% lock flush 10 milliLiter(s) IV Push every 1 hour PRN    acetaminophen   Tablet .. 650 milliGRAM(s) Oral every 6 hours PRN  anakinra Injectable 100 milliGRAM(s) SubCutaneous every 12 hours  atorvastatin 80 milliGRAM(s) Oral at bedtime  chlorhexidine 0.12% Liquid 15 milliLiter(s) Oral Mucosa every 12 hours  chlorhexidine 2% Cloths 1 Application(s) Topical daily  enoxaparin Injectable 70 milliGRAM(s) SubCutaneous daily  HYDROmorphone Infusion. 0.5 mG/Hr IV Continuous <Continuous>  insulin lispro (HumaLOG) corrective regimen sliding scale   SubCutaneous every 6 hours  methylPREDNISolone sodium succinate Injectable 40 milliGRAM(s) IV Push every 12 hours  pantoprazole  Injectable 40 milliGRAM(s) IV Push daily  phenylephrine    Infusion 0.4 MICROgram(s)/kG/Min IV Continuous <Continuous>  propofol Infusion 10 MICROgram(s)/kG/Min IV Continuous <Continuous>  sodium chloride 0.9% lock flush 10 milliLiter(s) IV Push every 1 hour PRN    Drug Dosing Weight    Weight (kg): 70 (08 Apr 2020 16:44)    CENTRAL LINE: RIJ x 4/11    NGUYEN: Strict IOs    A-LINE: R Rad A Line 2/2 ABG    [facility] -reviewed admission note, no change since admission  PAST MEDICAL & SURGICAL HISTORY:  Chronic pulmonary embolism, unspecified pulmonary embolism type, unspecified whether acute cor pulmonale present  Chronic obstructive pulmonary disease, unspecified COPD type  Hypertension, unspecified type  Diabetes mellitus of other type without complication  No significant past surgical history    ICU Vital Signs Last 24 Hrs  T(C): 35.2 (11 Apr 2020 04:00), Max: 36.2 (10 Apr 2020 12:00)  T(F): 95.3 (11 Apr 2020 04:00), Max: 97.1 (10 Apr 2020 12:00)  HR: 67 (11 Apr 2020 04:00) (48 - 124)  BP: 117/47 (11 Apr 2020 04:00) (83/50 - 123/62)  BP(mean): 72 (11 Apr 2020 04:00) (60 - 81)  RR: 21 (11 Apr 2020 04:00) (20 - 59)  SpO2: 92% (11 Apr 2020 04:00) (92% - 100%)    ABG - ( 10 Apr 2020 05:07 )  pH, Arterial: 7.37  pH, Blood: x     /  pCO2: 38    /  pO2: 152   / HCO3: 22    / Base Excess: -2.9  /  SaO2: 99        04-10 @ 07:01  -  04-11 @ 07:00  --------------------------------------------------------  IN: 1584.6 mL / OUT: 750 mL / NET: 834.6 mL    Mode: AC/ CMV (Assist Control/ Continuous Mandatory Ventilation)  RR (machine): 20  TV (machine): 400  FiO2: 60  PEEP: 10  ITime: 1  MAP: 13  PIP: 34    PHYSICAL EXAM:  Constitutional	Well-developed, well nourished  Eyes	conjuctivae clear  ENMT	No oral lesions; no gross abnormalities  Neck	No bruits; no thyromegaly or nodules   Back	No deformity or limitation of movement   Respiratory	Breath Sounds equal & clear to percussion & auscultation, no accessory muscle use  Cardiovascular	Regular rate & rhythm, normal S1, S2; no murmurs, gallops or rubs; no S3, S4  Gastrointestinal	Soft, non-tender, no hepatosplenomegaly, normal bowel sounds  Extremities	No cyanosis, clubbing or edema   Neurological	sedated      LABS:  CBC Full  -  ( 10 Apr 2020 06:25 )  WBC Count : 4.56 K/uL  RBC Count : 3.48 M/uL  Hemoglobin : 10.6 g/dL  Hematocrit : 33.7 %  Platelet Count - Automated : 132 K/uL  Mean Cell Volume : 96.8 fl  Mean Cell Hemoglobin : 30.5 pg  Mean Cell Hemoglobin Concentration : 31.5 gm/dL  Auto Neutrophil # : x  Auto Lymphocyte # : x  Auto Monocyte # : x  Auto Eosinophil # : x  Auto Basophil # : x  Auto Neutrophil % : x  Auto Lymphocyte % : x  Auto Monocyte % : x  Auto Eosinophil % : x  Auto Basophil % : x    04-11    139  |  105  |  102<H>  ----------------------------<  211<H>  4.1   |  23  |  3.01<H>    Ca    8.6      11 Apr 2020 06:26  Phos  4.6     04-11  Mg     3.0     04-11    TPro  7.1  /  Alb  2.3<L>  /  TBili  1.8<H>  /  DBili  x   /  AST  636<H>  /  ALT  329<H>  /  AlkPhos  88  04-11    RADIOLOGY & ADDITIONAL STUDIES REVIEWED:  RIJ/ETT in place    [x]GOALS OF CARE DISCUSSION WITH PATIENT/FAMILY/PROXY: DNR    CRITICAL CARE TIME SPENT: 35 minutes

## 2020-04-12 NOTE — PROGRESS NOTE ADULT - ASSESSMENT
64 M PMH CAD, HTN, DM, COPD, Chronic PE on Eliquis, BIB EMS for respiratory distress, s/p intubation, admit to ICU for AHRF 2/2 COVID +ve - initial comfort, now family wants continued ICU care    #Sepsis 2/2 COVID+    #COVID+  - Anakinra   - daily CBC/CMP/Mg/Phos/CRP  - COVID isolation protocol    #Hypoxic respiratory failure 2/2 ARDS 2/2 COVID+  - CXR on arrival with BL multilobar interstitial opacities c/w ARDS 2/2 COVID+   intubated and sedated for increased work of breathing now breathing in sync with vent  - continue to trend ABG with vent adjustments    #ARDS  - as above  -  AB.37/38/152/22    Oxygen Saturation, Arterial: 99 %    Blood Gas Comments Arterial: left radial; AC 20/400/70%/+10  - Low threshold to prone pt in order to improve oxygenation and secretion drainage    #Cardiac arrest on , ROSC achieved, now DNR,, family wants comfort care  - History of CA, COPD resume apixaban     #Sedation currently on fentanyl gtt and versed gtt   - RASS goal -4    #Sepsis 2/2 COVID+s  E: Replete K<4, Mg<2  N: TFs  G: protonix 40mg IV daily

## 2020-04-12 NOTE — PROGRESS NOTE ADULT - SUBJECTIVE AND OBJECTIVE BOX
INTERVAL HPI/OVERNIGHT EVENTS: No acute events overnight    PRESSORS: Pheny     Antimicrobial:    Cardiovascular:  phenylephrine    Infusion 0.4 MICROgram(s)/kG/Min IV Continuous <Continuous>    Pulmonary:    Hematalogic:  enoxaparin Injectable 70 milliGRAM(s) SubCutaneous daily    Other:  acetaminophen   Tablet .. 650 milliGRAM(s) Oral every 6 hours PRN  anakinra Injectable 100 milliGRAM(s) SubCutaneous every 12 hours  atorvastatin 80 milliGRAM(s) Oral at bedtime  chlorhexidine 0.12% Liquid 15 milliLiter(s) Oral Mucosa every 12 hours  chlorhexidine 2% Cloths 1 Application(s) Topical daily  HYDROmorphone Infusion. 0.5 mG/Hr IV Continuous <Continuous>  insulin lispro (HumaLOG) corrective regimen sliding scale   SubCutaneous every 6 hours  methylPREDNISolone sodium succinate Injectable 40 milliGRAM(s) IV Push every 12 hours  pantoprazole  Injectable 40 milliGRAM(s) IV Push daily  propofol Infusion 10 MICROgram(s)/kG/Min IV Continuous <Continuous>  sodium chloride 0.9% lock flush 10 milliLiter(s) IV Push every 1 hour PRN    acetaminophen   Tablet .. 650 milliGRAM(s) Oral every 6 hours PRN  anakinra Injectable 100 milliGRAM(s) SubCutaneous every 12 hours  atorvastatin 80 milliGRAM(s) Oral at bedtime  chlorhexidine 0.12% Liquid 15 milliLiter(s) Oral Mucosa every 12 hours  chlorhexidine 2% Cloths 1 Application(s) Topical daily  enoxaparin Injectable 70 milliGRAM(s) SubCutaneous daily  HYDROmorphone Infusion. 0.5 mG/Hr IV Continuous <Continuous>  insulin lispro (HumaLOG) corrective regimen sliding scale   SubCutaneous every 6 hours  methylPREDNISolone sodium succinate Injectable 40 milliGRAM(s) IV Push every 12 hours  pantoprazole  Injectable 40 milliGRAM(s) IV Push daily  phenylephrine    Infusion 0.4 MICROgram(s)/kG/Min IV Continuous <Continuous>  propofol Infusion 10 MICROgram(s)/kG/Min IV Continuous <Continuous>  sodium chloride 0.9% lock flush 10 milliLiter(s) IV Push every 1 hour PRN    Drug Dosing Weight    Weight (kg): 70 (08 Apr 2020 16:44)    CENTRAL LINE: RIJ x 4/11    NGUYEN: Strict IOs    A-LINE: R Rad A Line 2/2 ABG    [facility] -reviewed admission note, no change since admission  PAST MEDICAL & SURGICAL HISTORY:  Chronic pulmonary embolism, unspecified pulmonary embolism type, unspecified whether acute cor pulmonale present  Chronic obstructive pulmonary disease, unspecified COPD type  Hypertension, unspecified type  Diabetes mellitus of other type without complication  No significant past surgical history    ICU Vital Signs Last 24 Hrs  T(C): 36.4 (11 Apr 2020 20:00), Max: 36.4 (11 Apr 2020 20:00)  T(F): 97.5 (11 Apr 2020 20:00), Max: 97.5 (11 Apr 2020 20:00)  HR: 71 (11 Apr 2020 21:31) (67 - 84)  BP: 140/68 (11 Apr 2020 20:00) (122/66 - 145/68)  BP(mean): 89 (11 Apr 2020 20:00) (81 - 89)  RR: 20 (11 Apr 2020 20:00) (20 - 34)  SpO2: 92% (11 Apr 2020 21:31) (91% - 96%)    ABG - ( 10 Apr 2020 05:07 )  pH, Arterial: 7.37  pH, Blood: x     /  pCO2: 38    /  pO2: 152   / HCO3: 22    / Base Excess: -2.9  /  SaO2: 99        04-10 @ 07:01  -  04-11 @ 07:00  --------------------------------------------------------  IN: 1584.6 mL / OUT: 750 mL / NET: 834.6 mL    Mode: AC/ CMV (Assist Control/ Continuous Mandatory Ventilation)  RR (machine): 20  TV (machine): 400  FiO2: 50  PEEP: 10  ITime: 0.9  MAP: 15  PIP: 28    PHYSICAL EXAM:  Constitutional - Well-developed, well nourished  Eyes - conjunctivae clear  ENMT - No oral lesions; no gross abnormalities, ETT in place  Neck - No bruits; no thyromegaly or nodules, CVC site clean and dry  Back - No deformity or limitation of movement   Respiratory - basilar rhonchi, synchronous w/ ventilator   Cardiovascular - Regular rate & rhythm, normal S1, S2; no murmurs, gallops or rubs; no S3, S4  Gastrointestinal - Soft, non-tender, no hepatosplenomegaly, normal bowel sounds  Extremities - contracted  Neurological - sedated    LABS:  CBC Full  -  ( 11 Apr 2020 06:26 )  WBC Count : 4.94 K/uL  RBC Count : 3.66 M/uL  Hemoglobin : 11.1 g/dL  Hematocrit : 34.5 %  Platelet Count - Automated : 206 K/uL  Mean Cell Volume : 94.3 fl  Mean Cell Hemoglobin : 30.3 pg  Mean Cell Hemoglobin Concentration : 32.2 gm/dL  Auto Neutrophil # : 4.00 K/uL  Auto Lymphocyte # : 0.56 K/uL  Auto Monocyte # : 0.31 K/uL  Auto Eosinophil # : 0.00 K/uL  Auto Basophil # : 0.01 K/uL  Auto Neutrophil % : 81.0 %  Auto Lymphocyte % : 11.3 %  Auto Monocyte % : 6.3 %  Auto Eosinophil % : 0.0 %  Auto Basophil % : 0.2 %    04-11    139  |  105  |  102<H>  ----------------------------<  211<H>  4.1   |  23  |  3.01<H>    Ca    8.6      11 Apr 2020 06:26  Phos  4.6     04-11  Mg     3.0     04-11    TPro  7.1  /  Alb  2.3<L>  /  TBili  1.8<H>  /  DBili  x   /  AST  636<H>  /  ALT  329<H>  /  AlkPhos  88  04-11    RADIOLOGY & ADDITIONAL STUDIES REVIEWED:      < from: Xray Chest 1 View- PORTABLE-Routine (04.11.20 @ 07:29) >  FINDINGS: Since prior, no significant change. ETT, right IJ CVC and NGT are unchanged in position. Heart size appears within normal limits. Post sternotomy changes are identified. Extensive bilateral lung parenchymal airspace opacities are stable. No pleuraleffusion or pneumothorax demonstrated. No mediastinal shift is noted. No acute osseous fractures identified.    [x]GOALS OF CARE DISCUSSION WITH PATIENT/FAMILY/PROXY: DNR    CRITICAL CARE TIME SPENT: 35 minutes

## 2020-04-13 NOTE — PROGRESS NOTE ADULT - ATTENDING COMMENTS
acute hypoxic respiratory distress requiring intubation and sedation. Imaging c/w ARDS. Admitted to MICU for management.  #Sepsis 2/2 COVID+  #COVID+  - Anakinra   - daily CBC/CMP/Mg/Phos/CRP  - COVID isolation protocol  #Hypoxic respiratory failure 2/2 ARDS 2/2 COVID+  - CXR on arrival with BL multilobar interstitial opacities c/w ARDS 2/2 COVID+   intubated and sedated for increased work of breathing now breathing in sync with vent  - continue to trend ABG with vent adjustments  #ARDS  - as above  - Blood Gas Profile - Arterial (04.08.20 @ 17:48)    pH, Arterial: 7.30    pCO2, Arterial: 32 mmHg    pO2, Arterial: 183 mmHg    HCO3, Arterial: 15 mmol/L    Base Excess, Arterial: -9.8 mmol/L    Oxygen Saturation, Arterial: 99 %    FIO2, Arterial: 100    Blood Gas Comments Arterial: left radial; AC 20/400/100%/+15  - low threshold to prone pt in order to improve oxygenation and secretion drainage  - continue to closely monitor pt  caridac arrest over night, ROSC achieved, now DNR, pending family discussion regarding comfort care  history of LA, COPD resume apixaban   -will get ECG    #Sedation currently on fentanyl gtt and versed gtt   - RASS goal -4  #Sepsis 2/2 COVID+s  E: Replete K<4, Mg<2  N: NPO   G: protonix 40mg IV daily    Code: FULL CODE
Pt discussed on rounds with residents. In brief, pt is 64M h/o CAD, HTN, DM, COPD, chronic PE on eliquis a/w acute hypoxic resp failure 2/2 ARDS from Covid19 PNA, completing course of Anakinra and solumedrol today. Inflammatory markers downtrending. Pt sedated with propofol and dilaudid gtt and now with improving vent requirements. ABG reviewed, vent adjustments made utilizing lung protective strategy. Free water started for hypernatremia. sCr improving with improving urine output, likely post-ATN diuresis. d-dimer 2722, on lovenox, but can change to home eliquis dose. Lantus increased from 10-->15units daily for better glucose control. Plan to wean sedation in AM for spontaneous breathing/pressure support trial in attempts to wean for extubation in coming days.    Pt remains critically ill. Prognosis guarded. DNR

## 2020-04-13 NOTE — CHART NOTE - NSCHARTNOTEFT_GEN_A_CORE
Assessment:   Patient is a 64y old  Male who presents with a chief complaint of respiratory distress, COVID-19 (2020 13:54). Intubated. Covid +. Propofol @ 23.1 ml/hr (if x24 hrs= 610 kcals).       Factors impacting intake: [ ] none [ ] nausea  [ ] vomiting [ ] diarrhea [ ] constipation  [ ]chewing problems [ ] swallowing issues  [x ] other: critically ill, intubated.    Diet Prescription: Diet, NPO with Tube Feed:   Tube Feeding Modality: Nasogastric  Nepro with Carb Steady  Total Volume for 24 Hours (mL): 960  Continuous  Starting Tube Feed Rate {mL per Hour}: 10  Increase Tube Feed Rate by (mL): 10     Every 6 hours  Until Goal Tube Feed Rate (mL per Hour): 40  Tube Feed Duration (in Hours): 24  Tube Feed Start Time: 18:00 (20 @ 17:28)        Daily     Daily Weight in k.9 (2020 04:00)  Weight in k.3 (2020 00:00)  Weight in k (10 Apr 2020 17:21)        Pertinent Medications: MEDICATIONS  (STANDING):  amLODIPine   Tablet 10 milliGRAM(s) Oral daily  chlorhexidine 0.12% Liquid 15 milliLiter(s) Oral Mucosa every 12 hours  chlorhexidine 2% Cloths 1 Application(s) Topical daily  dexMEDEtomidine Infusion 0.2 MICROgram(s)/kG/Hr (3.5 mL/Hr) IV Continuous <Continuous>  dextrose 5%. 1000 milliLiter(s) (75 mL/Hr) IV Continuous <Continuous>  enoxaparin Injectable 70 milliGRAM(s) SubCutaneous daily  HYDROmorphone Infusion 0.5 mG/Hr (0.5 mL/Hr) IV Continuous <Continuous>  insulin glargine Injectable (LANTUS) 15 Unit(s) SubCutaneous every morning  insulin lispro (HumaLOG) corrective regimen sliding scale   SubCutaneous every 6 hours  insulin lispro Injectable (HumaLOG) 5 Unit(s) SubCutaneous every 6 hours  methylPREDNISolone sodium succinate Injectable 40 milliGRAM(s) IV Push every 12 hours  pantoprazole  Injectable 40 milliGRAM(s) IV Push daily  polyethylene glycol 3350 17 Gram(s) Oral daily  propofol Infusion 10 MICROgram(s)/kG/Min (4.2 mL/Hr) IV Continuous <Continuous>    MEDICATIONS  (PRN):  acetaminophen   Tablet .. 650 milliGRAM(s) Oral every 6 hours PRN Temp greater or equal to 38C (100.4F)  sodium chloride 0.9% lock flush 10 milliLiter(s) IV Push every 1 hour PRN Pre/post blood products, medications, blood draw, and to maintain line patency    Pertinent Labs:  Na151 mmol/L<H> Glu 309 mg/dL<H> K+ 4.0 mmol/L Cr  1.72 mg/dL<H>  mg/dL<H>  Phos 1.7 mg/dL<L>  Alb 2.3 g/dL<L>  [id number] 4.9 %     CAPILLARY BLOOD GLUCOSE      POCT Blood Glucose.: 386 mg/dL (2020 11:56)  POCT Blood Glucose.: 396 mg/dL (2020 09:12)  POCT Blood Glucose.: 313 mg/dL (2020 05:57)  POCT Blood Glucose.: 230 mg/dL (2020 23:31)  POCT Blood Glucose.: 296 mg/dL (2020 18:11)        Interventions:   Recommend  [ ] Change Diet To:  [ ] Nutrition Supplement  [x ] Nutrition Support; With Propofol @ 23.1 ml/hr: Glucerna 1.5 20x24 + 1 Pkt Prosource BID (Glucerna 1.5 480 ml, 720 kcals, 40 gm protein. Add 1 Pkt Prosource BID (+30 gm Protein, 120 kcals). MD to monitor. RD available.   [x ] Other: Triglyceride level.    Monitoring and Evaluation:   [ x ] Tolerance to diet prescription [ x ] weights [ x ] labs[ x ] follow up per protocol  [ ] other:

## 2020-04-13 NOTE — PROGRESS NOTE ADULT - ASSESSMENT
64 M PMH CAD, HTN, DM, COPD, Chronic PE on Eliquis, BIB EMS for respiratory distress, s/p intubation, admit to ICU for AHRF 2/2 COVID +ve - initial comfort, now family wants continued ICU care    #Sepsis 2/2 COVID+    Neuro:   - Sedated on Propofol 45 and Dilaudid 0.5    Cardio:   - Not requiring pressor support  - 10 mg of Amlodipine daily added    Respiratory:   #Hypoxic respiratory failure 2/2 ARDS 2/2 COVID+  - CXR on arrival with BL multilobar interstitial opacities c/w ARDS 2/2 COVID+   intubated and sedated for increased work of breathing now breathing in sync with vent  - continue to trend ABG with vent adjustments  - 20/400/60%/5    GI:  - No issues    Endo:   - Lantus increased from 10 to 15 for better glucose control     DVT  - Lovenox Full dose (70 mg daily )

## 2020-04-13 NOTE — PROGRESS NOTE ADULT - SUBJECTIVE AND OBJECTIVE BOX
INTERVAL HPI/OVERNIGHT EVENTS:     - Patient continues to ventilate well on low vent requirements  - Patient was noted to have high am BSG and Lantus was increased from 10 to 15 units  - Patient found to have Na 149 and was provided free water q 4 hours for relief  - Patient was  hypertensive and received 10 mg of Amlodipine for BP control   - Will continue to wean off of oxygen and propofol     Antimicrobial:    Cardiovascular:  amLODIPine   Tablet 10 milliGRAM(s) Oral daily    Pulmonary:    Hematalogic:  enoxaparin Injectable 70 milliGRAM(s) SubCutaneous daily    Other:  acetaminophen   Tablet .. 650 milliGRAM(s) Oral every 6 hours PRN  chlorhexidine 0.12% Liquid 15 milliLiter(s) Oral Mucosa every 12 hours  chlorhexidine 2% Cloths 1 Application(s) Topical daily  dexMEDEtomidine Infusion 0.2 MICROgram(s)/kG/Hr IV Continuous <Continuous>  HYDROmorphone Infusion 0.5 mG/Hr IV Continuous <Continuous>  insulin glargine Injectable (LANTUS) 15 Unit(s) SubCutaneous every morning  insulin lispro (HumaLOG) corrective regimen sliding scale   SubCutaneous every 6 hours  methylPREDNISolone sodium succinate Injectable 40 milliGRAM(s) IV Push every 12 hours  pantoprazole  Injectable 40 milliGRAM(s) IV Push daily  polyethylene glycol 3350 17 Gram(s) Oral daily  propofol Infusion 10 MICROgram(s)/kG/Min IV Continuous <Continuous>  sodium chloride 0.9% lock flush 10 milliLiter(s) IV Push every 1 hour PRN    Drug Dosing Weight    Weight (kg): 70 (08 Apr 2020 16:44)    CENTRAL LINE: RIJ x 4/11    NGUYEN: Strict IOs    A-LINE: R Rad A Line 2/2     PMH/Social Hx/[facility] Hx -reviewed admission note, no change since admission  PAST MEDICAL & SURGICAL HISTORY:  Chronic pulmonary embolism, unspecified pulmonary embolism type, unspecified whether acute cor pulmonale present  Chronic obstructive pulmonary disease, unspecified COPD type  Hypertension, unspecified type  Diabetes mellitus of other type without complication  No significant past surgical history    Heart faliure: acute [ ] chronic [ ] acute or chronic [ ] diastolic [ ] systolic [ ] combied systolic and diastolic[ ]  CHANDAN: ATN[ ] renal medullary necrosis [ ] CKD I [ ]CKDII [ ]CKD III [ ]CKD IV [ ]CKD V [ ]Other pathological lesions [ ]  Abdominal Nutrition Status: malnutrition [ ] cachexia [ ] morbid obesity/BMI=40 [ ] Supplement ordered [___________]     T(C): 37 (04-13-20 @ 12:00), Max: 37.3 (04-12-20 @ 20:34)  HR: 76 (04-13-20 @ 12:00)  BP: 130/70 (04-13-20 @ 12:00)  BP(mean): 89 (04-13-20 @ 12:00)  ABP: --  ABP(mean): --  RR: 26 (04-13-20 @ 12:00)  SpO2: 88% (04-13-20 @ 12:00)  Wt(kg): --    ABG - ( 13 Apr 2020 03:58 )  pH, Arterial: 7.47  pH, Blood: x     /  pCO2: 39    /  pO2: 70    / HCO3: 28    / Base Excess: 4.8   /  SaO2: 94        04-12 @ 07:01  -  04-13 @ 07:00  --------------------------------------------------------  IN: 473 mL / OUT: 1900 mL / NET: -1427 mL    Mode: AC/ CMV (Assist Control/ Continuous Mandatory Ventilation)  RR (machine): 20  TV (machine): 400  FiO2: 60  PEEP: 5  ITime: 1  MAP: 9  PIP: 23    PHYSICAL EXAM:    Constitutional - Well-developed, well nourished  Eyes - conjunctivae clear  ENMT - No oral lesions; no gross abnormalities, ETT in place  Neck - No bruits; no thyromegaly or nodules, CVC site clean and dry  Back - No deformity or limitation of movement   Respiratory - basilar rhonchi, synchronous w/ ventilator   Cardiovascular - Regular rate & rhythm, normal S1, S2; no murmurs, gallops or rubs; no S3, S4  Gastrointestinal - Soft, non-tender, no hepatosplenomegaly, normal bowel sounds  Extremities - contracted  Neurological - sedated    LABS:  CBC Full  -  ( 13 Apr 2020 03:55 )  WBC Count : 4.63 K/uL  RBC Count : 3.35 M/uL  Hemoglobin : 10.4 g/dL  Hematocrit : 31.7 %  Platelet Count - Automated : 183 K/uL  Mean Cell Volume : 94.6 fl  Mean Cell Hemoglobin : 31.0 pg  Mean Cell Hemoglobin Concentration : 32.8 gm/dL  Auto Neutrophil # : SEE COMMENT K/uL  Auto Lymphocyte # : SEE COMMENT K/uL  Auto Monocyte # : SEE COMMENT K/uL  Auto Eosinophil # : SEE COMMENT K/uL  Auto Basophil # : SEE COMMENT K/uL  Auto Neutrophil % : SEE COMMENT Manual diff was performed w-in 72 hrs.  Differential percentages must be correlated with absolute numbers for  clinical significance. %  Auto Lymphocyte % : SEE COMMENT %  Auto Monocyte % : SEE COMMENT %  Auto Eosinophil % : SEE COMMENT %  Auto Basophil % : SEE COMMENT %    04-13    149<H>  |  115<H>  |  113<H>  ----------------------------<  278<H>  5.3   |  29  |  1.76<H>    Ca    8.6      13 Apr 2020 03:55  Phos  1.7     04-13  Mg     2.6     04-13    RADIOLOGY & ADDITIONAL STUDIES REVIEWED:      IMPRESSION:  Stable follow-up study with no significant change    ANIL TELLEZ M.D., ATTENDING RADIOLOGIST  This document has been electronically signed. Apr 13 2020 11:10AM    [ ]GOALS OF CARE DISCUSSION WITH PATIENT/FAMILY/PROXY:    CRITICAL CARE TIME SPENT: 35 minutes

## 2020-04-14 NOTE — DISCHARGE NOTE FOR THE EXPIRED PATIENT - HOSPITAL COURSE
64 M with PMH CAD, HTN, DM, COPD, Chronic PE on Eliquis, BIB EMS for respiratory distress, was admitted to ICU for AHRF 2/2 COVID +ve. CXR on arrival showed with Bilateral multilobar interstitial opacities c/w ARDS 2/2 COVID+. Pt was intubated and sedated with fentanyl and versed for increased work of breathing. Pt had cardiac arrest on  and ROSC was achieved.   Pt  on 2020 at 6:48 AM.  HCP Mr. Ivey was informed.

## 2020-04-15 LAB
A-TUMOR NECROSIS FACT SERPL-MCNC: <5 PG/ML — SIGNIFICANT CHANGE UP
IL10 SERPL-MCNC: 144 PG/ML — HIGH
IL12 SERPL-MCNC: <5 PG/ML — SIGNIFICANT CHANGE UP
IL13 SERPL-MCNC: <5 PG/ML — SIGNIFICANT CHANGE UP
IL2 SERPL-MCNC: <5 PG/ML — SIGNIFICANT CHANGE UP
IL2 SERPL-MCNC: HIGH PG/ML
IL4 SERPL-MCNC: <5 PG/ML — SIGNIFICANT CHANGE UP
IL6 SERPL-MCNC: SIGNIFICANT CHANGE UP PG/ML
IL8 SERPL-MCNC: <5 PG/ML — SIGNIFICANT CHANGE UP
INTERFERON GAMMA: 12 PG/ML — HIGH
INTERLEUKIN 1 BETA: <5 PG/ML — SIGNIFICANT CHANGE UP
INTERLEUKIN 17: <5 PG/ML — SIGNIFICANT CHANGE UP
INTERLEUKIN 5: <5 PG/ML — SIGNIFICANT CHANGE UP

## 2025-06-12 NOTE — ED PROCEDURE NOTE - NS ED PROCEDURE ASSISTED BY
The procedure was performed independently
The procedure was performed independently
no weakness/no headache